# Patient Record
Sex: MALE | Race: BLACK OR AFRICAN AMERICAN | NOT HISPANIC OR LATINO | Employment: FULL TIME | URBAN - METROPOLITAN AREA
[De-identification: names, ages, dates, MRNs, and addresses within clinical notes are randomized per-mention and may not be internally consistent; named-entity substitution may affect disease eponyms.]

---

## 2018-01-15 NOTE — PROGRESS NOTES
CC: Right knee pain    32 y.o. Male who presents as a new patient to me. 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 Complaint today is right knee pain x 4-6 weeks with atraumatic onset. Pain began while running. Pain localizes anteromedial with radiation along medial joint line; initermittent pain with daily walking, worse with running. Usually runs up to 5 miles a day. Has backed off from this completely over the last mos without improvement in sxs. Attempted light running just a few days ago and had problems.  Denies swelling or effusions. No prominent mechanical symptoms. Denies instability. Better with rest, hot/cold modalities. History of previous IM nail right femur. Has had knee pain in past - MRI 2013 negative for internal derangement. History of L Knee ACLR w/ DB HS Auto in 2011 by Dr. Chacon. Overall doing well on the left side, occasional soreness. Here today to discuss diagnosis and treatment options.      Negative for smoking.   Negative for diabetes.      REVIEW OF SYSTEMS:   Constitution: Negative. Negative for chills, fever and night sweats.    Hematologic/Lymphatic: Negative for bleeding problem. Does not bruise/bleed easily.   Skin: Negative for dry skin, itching and rash.   Musculoskeletal: Negative for falls. Positive for right knee pain and  muscle weakness.     PAST MEDICAL HISTORY:   History reviewed. No pertinent past medical history.    PAST SURGICAL HISTORY:   History reviewed. No pertinent surgical history.    FAMILY HISTORY:   Family History   Problem Relation Age of Onset    Cancer Neg Hx        SOCIAL HISTORY:   Social History     Social History    Marital status: Single     Spouse name: N/A    Number of children: N/A    Years of education: N/A     Occupational History    Not on file.     Social History Main Topics    Smoking status: Never Smoker    Smokeless tobacco: Not on file    Alcohol use Not on file    Drug use: Unknown    Sexual activity: Not on file     Other Topics Concern     "Not on file     Social History Narrative    No narrative on file       MEDICATIONS:     Current Outpatient Prescriptions:     ibuprofen (ADVIL,MOTRIN) 600 MG tablet, Take 1 tablet (600 mg total) by mouth every 6 (six) hours as needed for Pain., Disp: 20 tablet, Rfl: 0    ondansetron (ZOFRAN) 4 MG tablet, Take 1 tablet (4 mg total) by mouth every 8 (eight) hours as needed., Disp: 12 tablet, Rfl: 0    ALLERGIES:   Review of patient's allergies indicates:  No Known Allergies     PHYSICAL EXAMINATION:  Ht 5' 11" (1.803 m)   Wt 79.4 kg (175 lb)   BMI 24.41 kg/m²   General: Well-developed well-nourished 32 y.o. malein no acute distress   Cardiovascular: Regular rhythm by palpation of distal pulse, normal color and temperature, no concerning varicosities on symptomatic side   Lungs: No labored breathing or wheezing appreciated   Neuro: Alert and oriented ×3   Psychiatric: well oriented to person, place and time, demonstrates normal mood and affect   Skin: No rashes, lesions or ulcers, normal temperature, turgor, and texture on involved extremity    Ortho/SPM Exam   Bilateral neutral standing alignment.  Examination of the right knee shows no swelling or effusion.  Good quadriceps bulk and tone.  Prominent tenderness over the anterior medial joint line.  Less prominent tenderness over the mid medial and posterior medial joint line.  Stable to varus and valgus stress at 0 and 30°.  No pain with valgus stress testing.  No tenderness specifically over the pes bursa.  Medial discomfort with Sunday's testing.  No mechanical symptoms.  Central patellofemoral tracking.  Negative patellar apprehension. Normal patellar mobility.  Negative patellar facet tenderness.  Negative tilt.  Negative Lachman.  Negative posterior drawer.  Full active and passive range of motion. Tight hamstrings.    Examination of the left knee demonstrates stable Lachman.  Full active and passive range of motion.    IMAGING:    X-rays including " standing, weight bearing AP and flexion bilateral knees, RIGHT knee lateral and sunrise views ordered and images reviewed by me show:    Evidence of previous intramedullary nailing.  No acute or chronic changes of significance.    MRI right knee 2013 - negative    ASSESSMENT:      ICD-10-CM ICD-9-CM   1. Right knee pain, unspecified chronicity M25.561 719.46       PLAN:     Patient presents with chronic recurrent right knee pain.  Anteromedially based.  Likely some degree of patellofemoral pain syndrome with anterior overload and overuse.  History and exam findings are also suspicious for possible medial meniscus tear.  Given the extent duration of his complaints, I believe a repeat MRI is most appropriate.  I'll see him back after the study to discuss results and treatment options.    Procedures

## 2018-01-16 ENCOUNTER — HOSPITAL ENCOUNTER (OUTPATIENT)
Dept: RADIOLOGY | Facility: HOSPITAL | Age: 33
Discharge: HOME | End: 2018-01-16
Attending: ORTHOPAEDIC SURGERY
Payer: COMMERCIAL

## 2018-01-16 ENCOUNTER — OFFICE VISIT (OUTPATIENT)
Dept: SPORTS MEDICINE | Facility: CLINIC | Age: 33
End: 2018-01-16
Payer: COMMERCIAL

## 2018-01-16 VITALS — HEIGHT: 71 IN | BODY MASS INDEX: 24.5 KG/M2 | WEIGHT: 175 LBS

## 2018-01-16 DIAGNOSIS — M25.562 LEFT KNEE PAIN, UNSPECIFIED CHRONICITY: Primary | ICD-10-CM

## 2018-01-16 DIAGNOSIS — M25.561 RIGHT KNEE PAIN, UNSPECIFIED CHRONICITY: ICD-10-CM

## 2018-01-16 DIAGNOSIS — M25.561 RIGHT KNEE PAIN, UNSPECIFIED CHRONICITY: Primary | ICD-10-CM

## 2018-01-16 PROCEDURE — 99204 OFFICE O/P NEW MOD 45 MIN: CPT | Mod: S$GLB,,, | Performed by: ORTHOPAEDIC SURGERY

## 2018-01-16 PROCEDURE — 73562 X-RAY EXAM OF KNEE 3: CPT | Mod: 26,59,LT, | Performed by: RADIOLOGY

## 2018-01-16 PROCEDURE — 73562 X-RAY EXAM OF KNEE 3: CPT | Mod: TC,FY,PO,LT

## 2018-01-16 PROCEDURE — 73564 X-RAY EXAM KNEE 4 OR MORE: CPT | Mod: 26,RT,, | Performed by: RADIOLOGY

## 2018-01-16 PROCEDURE — 99999 PR PBB SHADOW E&M-EST. PATIENT-LVL III: CPT | Mod: PBBFAC,,, | Performed by: ORTHOPAEDIC SURGERY

## 2018-01-16 PROCEDURE — 73564 X-RAY EXAM KNEE 4 OR MORE: CPT | Mod: TC,50,FY,PO

## 2018-01-22 ENCOUNTER — HOSPITAL ENCOUNTER (OUTPATIENT)
Dept: RADIOLOGY | Facility: HOSPITAL | Age: 33
Discharge: HOME | End: 2018-01-22
Attending: ORTHOPAEDIC SURGERY
Payer: COMMERCIAL

## 2018-01-22 DIAGNOSIS — M25.561 RIGHT KNEE PAIN, UNSPECIFIED CHRONICITY: ICD-10-CM

## 2018-01-22 PROCEDURE — 73721 MRI JNT OF LWR EXTRE W/O DYE: CPT | Mod: TC,RT

## 2018-01-22 PROCEDURE — 73721 MRI JNT OF LWR EXTRE W/O DYE: CPT | Mod: 26,RT,, | Performed by: RADIOLOGY

## 2018-01-25 ENCOUNTER — OFFICE VISIT (OUTPATIENT)
Dept: SPORTS MEDICINE | Facility: CLINIC | Age: 33
End: 2018-01-25
Payer: COMMERCIAL

## 2018-01-25 VITALS
SYSTOLIC BLOOD PRESSURE: 133 MMHG | DIASTOLIC BLOOD PRESSURE: 84 MMHG | BODY MASS INDEX: 24.5 KG/M2 | HEART RATE: 68 BPM | WEIGHT: 175 LBS | HEIGHT: 71 IN

## 2018-01-25 DIAGNOSIS — M22.2X1 PATELLOFEMORAL PAIN SYNDROME OF RIGHT KNEE: Primary | ICD-10-CM

## 2018-01-25 DIAGNOSIS — E88.89 HOFFA'S FAT PAD DISEASE: ICD-10-CM

## 2018-01-25 PROCEDURE — 20610 DRAIN/INJ JOINT/BURSA W/O US: CPT | Mod: RT,S$GLB,, | Performed by: ORTHOPAEDIC SURGERY

## 2018-01-25 PROCEDURE — 99213 OFFICE O/P EST LOW 20 MIN: CPT | Mod: 25,S$GLB,, | Performed by: ORTHOPAEDIC SURGERY

## 2018-01-25 PROCEDURE — 99999 PR PBB SHADOW E&M-EST. PATIENT-LVL III: CPT | Mod: PBBFAC,,, | Performed by: ORTHOPAEDIC SURGERY

## 2018-01-25 RX ADMIN — TRIAMCINOLONE ACETONIDE 40 MG: 40 INJECTION, SUSPENSION INTRA-ARTICULAR; INTRAMUSCULAR at 04:01

## 2018-01-28 RX ORDER — INDOMETHACIN 75 MG/1
75 CAPSULE, EXTENDED RELEASE ORAL 2 TIMES DAILY WITH MEALS
Qty: 60 CAPSULE | Refills: 1 | Status: SHIPPED | OUTPATIENT
Start: 2018-01-28 | End: 2018-01-28 | Stop reason: SDUPTHER

## 2018-01-28 RX ORDER — TRIAMCINOLONE ACETONIDE 40 MG/ML
40 INJECTION, SUSPENSION INTRA-ARTICULAR; INTRAMUSCULAR
Status: DISCONTINUED | OUTPATIENT
Start: 2018-01-25 | End: 2018-01-28 | Stop reason: HOSPADM

## 2018-01-28 RX ORDER — INDOMETHACIN 75 MG/1
75 CAPSULE, EXTENDED RELEASE ORAL 2 TIMES DAILY WITH MEALS
Qty: 60 CAPSULE | Refills: 1 | Status: SHIPPED | OUTPATIENT
Start: 2018-01-28 | End: 2018-02-27

## 2018-01-30 ENCOUNTER — TELEPHONE (OUTPATIENT)
Dept: SPORTS MEDICINE | Facility: CLINIC | Age: 33
End: 2018-01-30

## 2018-01-30 ENCOUNTER — PATIENT MESSAGE (OUTPATIENT)
Dept: SPORTS MEDICINE | Facility: CLINIC | Age: 33
End: 2018-01-30

## 2018-03-12 NOTE — PROGRESS NOTES
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 Returns for f/u his recurrent anteromedially based right knee pain. Imaging showed free edge fraying of the medial meniscus, hx and exam consistent with mixed PFPS/anterior overload and inflammed infrapatellar fat pad as well. Steroid Injection on 1/25 provided him with minimal relief. Has been doing a self directed exercise program which has been dedicated and appropriate. Returned to running, a 5 mile run 1-2 x week while wearing the brace. One swelling episode since LOV while he was walking up the stairs and a retropatellar catch. Mild swelling which subsided after a few days. No mechanical symptoms. Denies instability.     History of previous IM nail right femur. Has had knee pain in past - MRI 2013 negative for internal derangement.     History of L Knee ACLR w/ DB HS Auto in 2011 by Dr. Chacon. Overall doing well on the left side, occasional soreness.   Negative for smoking.   Negative for diabetes.     REVIEW OF SYSTEMS:   Constitution: Negative. Negative for chills, fever and night sweats.    Hematologic/Lymphatic: Negative for bleeding problem. Does not bruise/bleed easily.   Skin: Negative for dry skin, itching and rash.   Musculoskeletal: Negative for falls. Positive for right knee pain and  muscle weakness.     PAST MEDICAL HISTORY:   History reviewed. No pertinent past medical history.    PAST SURGICAL HISTORY:   History reviewed. No pertinent surgical history.    FAMILY HISTORY:   Family History   Problem Relation Age of Onset    Cancer Neg Hx        SOCIAL HISTORY:   Social History     Social History    Marital status: Single     Spouse name: N/A    Number of children: N/A    Years of education: N/A     Occupational History    Not on file.     Social History Main Topics    Smoking status: Never Smoker    Smokeless tobacco: Not on file    Alcohol use Not on file     "Drug use: Unknown    Sexual activity: Not on file     Other Topics Concern    Not on file     Social History Narrative    No narrative on file       MEDICATIONS:     Current Outpatient Prescriptions:     ibuprofen (ADVIL,MOTRIN) 600 MG tablet, Take 1 tablet (600 mg total) by mouth every 6 (six) hours as needed for Pain., Disp: 20 tablet, Rfl: 0    ondansetron (ZOFRAN) 4 MG tablet, Take 1 tablet (4 mg total) by mouth every 8 (eight) hours as needed., Disp: 12 tablet, Rfl: 0    ALLERGIES:   Review of patient's allergies indicates:  No Known Allergies     PHYSICAL EXAMINATION:  /70   Pulse 63   Ht 5' 11" (1.803 m)   Wt 79.4 kg (175 lb)   BMI 24.41 kg/m²   General: Well-developed well-nourished 32 y.o. malein no acute distress   Cardiovascular: Regular rhythm by palpation of distal pulse, normal color and temperature, no concerning varicosities on symptomatic side   Lungs: No labored breathing or wheezing appreciated   Neuro: Alert and oriented ×3   Psychiatric: well oriented to person, place and time, demonstrates normal mood and affect   Skin: No rashes, lesions or ulcers, normal temperature, turgor, and texture on involved extremity    Ortho/SPM Exam   Repeat right knee exam shows again bilateral neutral standing alignment.  No swelling or effusion.  Good quadriceps bulk and tone.  Prominent tenderness over the anterior medial>direct medial; joint line and deep over the infrapatellar fat pad.  No tenderness specifically over the pes bursa.  Mild pain medially with Sunday's testing. Neg Thessaly's test.  Central patellofemoral tracking.  Negative patellar apprehension. Normal patellar mobility.  Negative patellar facet tenderness.  Negative tilt.  No tenderness along course of the patellar tendon. Negative Lachman.  Negative posterior drawer. Stable to varus and valgus stress at 0 and 30°.  No pain with valgus stress testing.  Full active and passive range of motion.  Improved quadriceps and hip " abductor strength. Normal hamstring stretch    IMAGING:    X-rays including standing, weight bearing AP and flexion bilateral knees, RIGHT knee lateral and sunrise views ordered and images reviewed by me show:    Evidence of previous intramedullary nailing.  No acute or chronic changes of significance.    Right Knee MRI was reviewed with the patient. Images show:   1. Central free edge fraying of the body of the medial meniscus. Otherwise, no evidence of internal derangement.   2. Partially visualized intramedullary fixation nail spanning the distal femoral diaphysis.  On my read there's a small area of inflammation and increased signal over the infrapatellar fat pad. Certainly no prominent findings otherwise.     ASSESSMENT:      ICD-10-CM ICD-9-CM   1. Other old tear of medial meniscus of right knee M23.203 717.3   2. 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 Patellofemoral pain syndrome of right knee M22.2X1 719.46       PLAN:     The patient has become quite frustrated with his continued pain.  This is been an issue off and on for 3-4 months.  Does not necessarily restrict activity but is bothersome particularly with higher impact exercises.  Pain persists despite initial conservative treatment.  At this point treatment options were reviewed.  I have offered a trial of Visco supplement injections which may assist particularly with the anterior knee component of his pain.  I do believe his medial meniscus free edge fraying may be symptomatic for him as well, and in that respect, I believe diagnostic arthroscopy with meniscus treatment as indicated is also an option.  The details of such were reviewed.  The patient would like to think things over and will let us know how he would like to proceed.    Procedures

## 2018-03-13 ENCOUNTER — OFFICE VISIT (OUTPATIENT)
Dept: SPORTS MEDICINE | Facility: CLINIC | Age: 33
End: 2018-03-13
Payer: COMMERCIAL

## 2018-03-13 VITALS
BODY MASS INDEX: 24.5 KG/M2 | HEIGHT: 71 IN | SYSTOLIC BLOOD PRESSURE: 132 MMHG | HEART RATE: 63 BPM | DIASTOLIC BLOOD PRESSURE: 70 MMHG | WEIGHT: 175 LBS

## 2018-03-13 DIAGNOSIS — M22.2X1 PATELLOFEMORAL PAIN SYNDROME OF RIGHT KNEE: ICD-10-CM

## 2018-03-13 DIAGNOSIS — M23.203 OTHER OLD TEAR OF MEDIAL MENISCUS OF RIGHT KNEE: Primary | ICD-10-CM

## 2018-03-13 DIAGNOSIS — E88.89 HOFFA'S FAT PAD DISEASE: ICD-10-CM

## 2018-03-13 PROCEDURE — 99999 PR PBB SHADOW E&M-EST. PATIENT-LVL III: CPT | Mod: PBBFAC,,, | Performed by: ORTHOPAEDIC SURGERY

## 2018-03-13 PROCEDURE — 99213 OFFICE O/P EST LOW 20 MIN: CPT | Mod: S$GLB,,, | Performed by: ORTHOPAEDIC SURGERY

## 2018-03-15 RX ORDER — IBUPROFEN 600 MG/1
600 TABLET ORAL EVERY 6 HOURS PRN
Qty: 20 TABLET | Refills: 0 | Status: SHIPPED | OUTPATIENT
Start: 2018-03-15

## 2018-03-19 ENCOUNTER — TELEPHONE (OUTPATIENT)
Dept: SPORTS MEDICINE | Facility: CLINIC | Age: 33
End: 2018-03-19

## 2018-03-19 NOTE — TELEPHONE ENCOUNTER
Patient brought paperwork regarding his physical capabilities for [employer].The paperwork was complete. A message was left for the patient that he can pick the originals up from our  anytime during business hours.    Melodie Lindsey MA  [facility]

## 2018-11-05 NOTE — PROGRESS NOTES
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 Returns for f/u his recurrent anteromedially based right knee pain. Imaging showed free edge fraying of the medial meniscus, hx and exam consistent with mixed PFPS/anterior overload and inflammed infrapatellar fat pad as well. Steroid Injection on 1/25 provided him with minimal relief. Pain has been more consistent as of late. Some pain with daily walking, pain exacerbated with running. No mechanical symptoms. Denies instability. Additional treatment has included home exercise program, oral medication, injections and activity modifications.    History of previous IM nail right femur. Has had knee pain in past - MRI 2013 negative for internal derangement.     History of L Knee ACLR w/ DB HS Auto in 2011 by Dr. Chacon. Overall doing well on the left side, occasional soreness.   Negative for smoking.   Negative for diabetes.     REVIEW OF SYSTEMS:   Constitution: Negative. Negative for chills, fever and night sweats.    Hematologic/Lymphatic: Negative for bleeding problem. Does not bruise/bleed easily.   Skin: Negative for dry skin, itching and rash.   Musculoskeletal: Negative for falls. Positive for right knee pain and  muscle weakness.     All other review of symptoms were reviewed and found to be noncontributory.     PAST MEDICAL HISTORY:   History reviewed. No pertinent past medical history.    PAST SURGICAL HISTORY:   History reviewed. No pertinent surgical history.    FAMILY HISTORY:   Family History   Problem Relation Age of Onset    Cancer Neg Hx        SOCIAL HISTORY:   Social History     Socioeconomic History    Marital status: Single     Spouse name: Not on file    Number of children: Not on file    Years of education: Not on file    Highest education level: Not on file   Social Needs    Financial resource strain: Not on file    Food insecurity - worry: Not on file    Food  "insecurity - inability: Not on file    Transportation needs - medical: Not on file    Transportation needs - non-medical: Not on file   Occupational History    Not on file   Tobacco Use    Smoking status: Never Smoker   Substance and Sexual Activity    Alcohol use: Not on file    Drug use: Not on file    Sexual activity: Not on file   Other Topics Concern    Not on file   Social History Narrative    Not on file       MEDICATIONS:     Current Outpatient Medications:     ibuprofen (ADVIL,MOTRIN) 600 MG tablet, Take 1 tablet (600 mg total) by mouth every 6 (six) hours as needed for Pain., Disp: 20 tablet, Rfl: 0    ondansetron (ZOFRAN) 4 MG tablet, Take 1 tablet (4 mg total) by mouth every 8 (eight) hours as needed., Disp: 12 tablet, Rfl: 0    ALLERGIES:   Review of patient's allergies indicates:  No Known Allergies     PHYSICAL EXAMINATION:  BP (!) 149/93   Pulse 62   Ht 5' 11" (1.803 m)   Wt 79.4 kg (175 lb)   BMI 24.41 kg/m²   General: Well-developed well-nourished 32 y.o. malein no acute distress   Cardiovascular: Regular rhythm by palpation of distal pulse, normal color and temperature, no concerning varicosities on symptomatic side   Lungs: No labored breathing or wheezing appreciated   Neuro: Alert and oriented ×3   Psychiatric: well oriented to person, place and time, demonstrates normal mood and affect   Skin: No rashes, lesions or ulcers, normal temperature, turgor, and texture on involved extremity    Ortho/SPM Exam   Repeat right knee exam shows again bilateral neutral standing alignment.  No swelling or effusion.  Good quadriceps bulk and tone.  Prominent tenderness over the anterior medial>direct medial; joint line and deep over the infrapatellar fat pad.  No tenderness specifically over the pes bursa.  Positive Sunday's test for pain medially.  Pain medially with varus load.  Central patellofemoral tracking.  Negative patellar apprehension. Normal patellar mobility.  Negative patellar " facet tenderness.  Negative tilt.  No tenderness along course of the patellar tendon. Negative Lachman.  Negative posterior drawer. Stable to varus and valgus stress at 0 and 30°.  No pain with valgus stress testing.  Full active and passive range of motion.  Improved quadriceps and hip abductor strength. Normal hamstring stretch    IMAGING:    X-rays including standing, weight bearing AP and flexion bilateral knees, RIGHT knee lateral and sunrise views ordered and images reviewed by me show:    Evidence of previous intramedullary nailing.  No acute or chronic changes of significance.    Right Knee MRI was reviewed with the patient. Images show:   1. Central free edge fraying of the body of the medial meniscus. Otherwise, no evidence of internal derangement.   2. Partially visualized intramedullary fixation nail spanning the distal femoral diaphysis.  On my read there's a small area of inflammation and increased signal over the infrapatellar fat pad. Certainly no prominent findings otherwise.     ASSESSMENT:      ICD-10-CM ICD-9-CM   1. Other old tear of medial meniscus of right knee M23.203 717.3   2. 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 Patellofemoral pain syndrome of right knee M22.2X1 719.46       PLAN:     -Findings and treatment options were discussed with the patient, both operative and non operative   -Extensive non operative treatment to this point with continued symptoms which limited his activities.  -Plan for a R Knee arthroscopic medial meniscectomy, chondroplasty, fat pad debridement. The details of such were discussed to include the expected postop rehab recovery course.    -Patient will discuss timing with his wife & call our office with a preferred date   -No med clearance needed  -RTC for preoperative appointment   -All questions answered    Informed Consent:    The details of the surgical procedure were explained, including the location of probable incisions and a description of possible  hardware and/or grafts to be used. We also discussed the potential benefit of Amniox tissue biologic augmentation with associated literature support, theoretical risks and benefits. Alternatives to both operative and non-operative options with associated risks and benefits were discussed. The patient understands the likely convalescence after surgery and, in particular, the expected postop rehab and recovery course. The outlined risks and potential complications of the proposed procedure include but are not limited to: infection, poor wound healing, scarring, deformity, stiffness, swelling, continued or recurrent pain, instability, hardware or prosthetic failure if implanted, symptomatic hardware requiring removal, weakness, neurovascular injury, numbness, chronic regional pain disorder, tissue nonhealing/irreparability/retear, subsequent contralateral limb injury or pathology, chondral injury, arthritis, fracture, blood clot formation, inability to return to previous level of activity, anesthetic or regional block complication up to death, need for additional procedure as indicated intraoperatively, and potential need for further surgery.    The patient was also informed and understands that the risks of surgery are greater for patients with a current condition or history of heart disease, obesity, clotting disorders, recurrent infections, steroid use, current or past smoking, and factors such as sedentary lifestyle and noncompliance with medications, therapy or follow-up. The degree of the increased risk is hard to estimate with any degree of precision. If applicable, smoking cessation was discussed.     All questions were answered. The patient has verbalized understanding of these issues and wishes to proceed with the surgery as discussed.        Procedures

## 2018-11-06 ENCOUNTER — OFFICE VISIT (OUTPATIENT)
Dept: SPORTS MEDICINE | Facility: CLINIC | Age: 33
End: 2018-11-06
Payer: COMMERCIAL

## 2018-11-06 VITALS
HEART RATE: 62 BPM | BODY MASS INDEX: 24.5 KG/M2 | SYSTOLIC BLOOD PRESSURE: 149 MMHG | WEIGHT: 175 LBS | DIASTOLIC BLOOD PRESSURE: 93 MMHG | HEIGHT: 71 IN

## 2018-11-06 DIAGNOSIS — M22.2X1 PATELLOFEMORAL PAIN SYNDROME OF RIGHT KNEE: ICD-10-CM

## 2018-11-06 DIAGNOSIS — M23.203 OTHER OLD TEAR OF MEDIAL MENISCUS OF RIGHT KNEE: Primary | ICD-10-CM

## 2018-11-06 DIAGNOSIS — E88.89 HOFFA'S FAT PAD DISEASE: ICD-10-CM

## 2018-11-06 PROCEDURE — 3008F BODY MASS INDEX DOCD: CPT | Mod: CPTII,S$GLB,, | Performed by: ORTHOPAEDIC SURGERY

## 2018-11-06 PROCEDURE — 99999 PR PBB SHADOW E&M-EST. PATIENT-LVL III: CPT | Mod: PBBFAC,,, | Performed by: ORTHOPAEDIC SURGERY

## 2018-11-06 PROCEDURE — 99214 OFFICE O/P EST MOD 30 MIN: CPT | Mod: S$GLB,,, | Performed by: ORTHOPAEDIC SURGERY

## 2018-11-09 DIAGNOSIS — M79.4 HYPERTROPHY OF FAT PAD OF KNEE: Primary | ICD-10-CM

## 2018-11-09 DIAGNOSIS — M23.203 OLD TEAR OF MEDIAL MENISCUS OF RIGHT KNEE, UNSPECIFIED TEAR TYPE: ICD-10-CM

## 2018-12-12 ENCOUNTER — HOSPITAL ENCOUNTER (OUTPATIENT)
Dept: PREADMISSION TESTING | Facility: OTHER | Age: 33
Discharge: HOME | End: 2018-12-12
Attending: ORTHOPAEDIC SURGERY
Payer: COMMERCIAL

## 2018-12-12 ENCOUNTER — ANESTHESIA EVENT (OUTPATIENT)
Dept: SURGERY | Facility: OTHER | Age: 33
End: 2018-12-12
Payer: COMMERCIAL

## 2018-12-12 ENCOUNTER — OFFICE VISIT (OUTPATIENT)
Dept: SPORTS MEDICINE | Facility: CLINIC | Age: 33
End: 2018-12-12
Payer: COMMERCIAL

## 2018-12-12 VITALS
HEIGHT: 71 IN | HEART RATE: 72 BPM | TEMPERATURE: 98 F | DIASTOLIC BLOOD PRESSURE: 84 MMHG | BODY MASS INDEX: 24.5 KG/M2 | OXYGEN SATURATION: 100 % | WEIGHT: 175 LBS | SYSTOLIC BLOOD PRESSURE: 131 MMHG

## 2018-12-12 VITALS
BODY MASS INDEX: 24.5 KG/M2 | WEIGHT: 175 LBS | HEART RATE: 65 BPM | SYSTOLIC BLOOD PRESSURE: 131 MMHG | HEIGHT: 71 IN | DIASTOLIC BLOOD PRESSURE: 81 MMHG

## 2018-12-12 DIAGNOSIS — E88.89 HOFFA'S FAT PAD DISEASE: ICD-10-CM

## 2018-12-12 DIAGNOSIS — M23.91 INTERNAL DERANGEMENT OF RIGHT KNEE: Primary | ICD-10-CM

## 2018-12-12 DIAGNOSIS — M23.203 OTHER OLD TEAR OF MEDIAL MENISCUS OF RIGHT KNEE: ICD-10-CM

## 2018-12-12 DIAGNOSIS — M22.2X1 PATELLOFEMORAL PAIN SYNDROME OF RIGHT KNEE: ICD-10-CM

## 2018-12-12 PROCEDURE — 99499 UNLISTED E&M SERVICE: CPT | Mod: S$GLB,,, | Performed by: PHYSICIAN ASSISTANT

## 2018-12-12 PROCEDURE — 99999 PR PBB SHADOW E&M-EST. PATIENT-LVL III: CPT | Mod: PBBFAC,,, | Performed by: PHYSICIAN ASSISTANT

## 2018-12-12 RX ORDER — ASPIRIN 81 MG/1
TABLET ORAL
Qty: 28 TABLET | Refills: 0 | COMMUNITY
Start: 2018-12-12

## 2018-12-12 RX ORDER — ONDANSETRON 4 MG/1
4 TABLET, FILM COATED ORAL EVERY 8 HOURS PRN
Qty: 30 TABLET | Refills: 0 | Status: SHIPPED | OUTPATIENT
Start: 2018-12-12

## 2018-12-12 RX ORDER — ISOTRETINOIN 40 MG/1
40 CAPSULE ORAL WEEKLY
COMMUNITY

## 2018-12-12 RX ORDER — SODIUM CHLORIDE, SODIUM LACTATE, POTASSIUM CHLORIDE, CALCIUM CHLORIDE 600; 310; 30; 20 MG/100ML; MG/100ML; MG/100ML; MG/100ML
INJECTION, SOLUTION INTRAVENOUS CONTINUOUS
Status: CANCELLED | OUTPATIENT
Start: 2018-12-12

## 2018-12-12 RX ORDER — SODIUM CHLORIDE 0.9 % (FLUSH) 0.9 %
5 SYRINGE (ML) INJECTION
Status: CANCELLED | OUTPATIENT
Start: 2018-12-12

## 2018-12-12 RX ORDER — OXYCODONE HYDROCHLORIDE 5 MG/1
TABLET ORAL
Qty: 42 TABLET | Refills: 0 | Status: SHIPPED | OUTPATIENT
Start: 2018-12-12

## 2018-12-12 RX ORDER — MUPIROCIN 20 MG/G
OINTMENT TOPICAL
Status: CANCELLED | OUTPATIENT
Start: 2018-12-12

## 2018-12-12 NOTE — H&P
Anibal Pedro III  is here for a completion of his perioperative paperwork. he  Is scheduled to undergo  R Knee arthroscopic medial meniscectomy, chondroplasty, fat pad debridement   on 12/14/2018.  He is a healthy individual and does not need clearance for this procedure.     Risks, indications and benefits of the surgical procedure were discussed with the patient. All questions with regard to surgery, rehab, expected return to functional activities, activities of daily living and recreational endeavors were answered to his satisfaction.    Patient was informed and understands the risks of surgery are greater for patients with a current condition or hx of heart disease, obesity, clotting disorders, recurrent infections, steroid use, current or past smoking, and factors such as sedentary lifestyle and noncompliance with medications, therapy or f/u. The degree of the increased risk is hard to estimate w/ any degree of precision.    Once no other questions were asked, a brief history and physical exam was then performed.    PAST MEDICAL HISTORY: No past medical history on file.  PAST SURGICAL HISTORY: No past surgical history on file.  FAMILY HISTORY:   Family History   Problem Relation Age of Onset    Cancer Neg Hx      SOCIAL HISTORY:   Social History     Socioeconomic History    Marital status: Single     Spouse name: Not on file    Number of children: Not on file    Years of education: Not on file    Highest education level: Not on file   Social Needs    Financial resource strain: Not on file    Food insecurity - worry: Not on file    Food insecurity - inability: Not on file    Transportation needs - medical: Not on file    Transportation needs - non-medical: Not on file   Occupational History    Not on file   Tobacco Use    Smoking status: Never Smoker   Substance and Sexual Activity    Alcohol use: Not on file    Drug use: Not on file    Sexual activity: Not on file   Other Topics  Concern    Not on file   Social History Narrative    Not on file       MEDICATIONS:   Current Outpatient Medications:     ibuprofen (ADVIL,MOTRIN) 600 MG tablet, Take 1 tablet (600 mg total) by mouth every 6 (six) hours as needed for Pain., Disp: 20 tablet, Rfl: 0    ondansetron (ZOFRAN) 4 MG tablet, Take 1 tablet (4 mg total) by mouth every 8 (eight) hours as needed., Disp: 12 tablet, Rfl: 0  ALLERGIES: Review of patient's allergies indicates:  No Known Allergies    Review of Systems   Constitution: Negative. Negative for chills, fever and night sweats.   HENT: Negative for congestion and headaches.    Eyes: Negative for blurred vision, left vision loss and right vision loss.   Cardiovascular: Negative for chest pain and syncope.   Respiratory: Negative for cough and shortness of breath.    Endocrine: Negative for polydipsia, polyphagia and polyuria.   Hematologic/Lymphatic: Negative for bleeding problem. Does not bruise/bleed easily.   Skin: Negative for dry skin, itching and rash.   Musculoskeletal: Negative for falls and muscle weakness.   Gastrointestinal: Negative for abdominal pain and bowel incontinence.   Genitourinary: Negative for bladder incontinence and nocturia.   Neurological: Negative for disturbances in coordination, loss of balance and seizures.   Psychiatric/Behavioral: Negative for depression. The patient does not have insomnia.    Allergic/Immunologic: Negative for hives and persistent infections.     PHYSICAL EXAM:  GEN: A&Ox3, WD WN NAD  HEENT: WNL  CHEST: CTAB, no W/R/R  HEART: RRR, no M/R/G   ABD: Soft, NT ND, BS x4 QUADS  MS: Refer to previous note for detailed MS exam  NEURO: CN II-XII intact       The surgical consent was then reviewed with the patient, who agreed with all the contents of the consent form and it was signed.     PHYSICAL THERAPY:  He was also instructed regarding physical therapy and will begin on POD#1-3. He is doing physical therapy at [facility].      POST OP CARE: Instructions were reviewed including care of the wound and dressing after surgery and when he can shower.     PAIN MANAGEMENT: Anibal Pedro III was instructed regarding the Polar ice unit that will be in place after surgery and his postoperative pain medications.     MEDICATION:  Roxicodone 5 mg 1-2 q 4 hours PRN for pain  Zofran 4 mg q 8 hours PRN for nausea and vomiting.  Aspirin 81mg BID x 2 weeks for DVT prophylaxis starting on the evening after surgery.    Patient was instructed to purchase and take Colace to counter possible GI side effects of taking opiates.     DVT prophylaxis was discussed with the patient today including risk factors for developing DVTs and history of DVTs. The patient was asked if any specific recommendations were given from the doctor/s that did pre-operative surgical clearance.      If the patient was previously taking 81mg baby aspirin, they were told to not take additional baby aspirin, using the above stated aspirin and to restart the 81mg aspirin daily after completion of the aspirin dose.      Patient was also told to buy over the counter Prilosec medication and take it once daily for GI protection as long as they are taking NSAIDs or Aspirin.     The patient was told that narcotic pain medications may make them drowsy and instructions were given to not sign legal documents, drive or operate heavy machinery, cars, or equipment while under the influence of narcotic medications.     Dr. Stevenson was present in clinic during this pre-op evaluation. The patient was offered the opportunity to ask Dr. Stevenson any further questions regarding the procedure which may not have been addressed during their previous informed consent discussion. The patient has declined to see Dr. Stevenson today.    As there were no other questions to be asked, he was given my business card along with Dr. Stevenson's business card if he has any questions or concerns prior to  surgery or in the postop period.

## 2018-12-12 NOTE — DISCHARGE INSTRUCTIONS
PRE-ADMIT TESTING -  [phone]    [address]  [facility]          Your surgery has been scheduled at [facility]. We are pleased to have the opportunity to serve you. For Further Information please call [phone].    On the day of surgery please report to the Information Desk on the 1st floor.    · CONTACT YOUR PHYSICIAN'S OFFICE THE DAY PRIOR TO YOUR SURGERY TO OBTAIN YOUR ARRIVAL TIME.     · The evening before surgery do not eat anything after 9 p.m. ( this includes hard candy, chewing gum and mints).  You may only have GATORADE, POWERADE AND WATER  from 9 p.m. until you leave your home.   DO NOT DRINK ANY LIQUIDS ON THE WAY TO THE HOSPITAL.      SPECIAL MEDICATION INSTRUCTIONS: TAKE medications checked off by the Anesthesiologist on your Medication List.    Angiogram Patients: Take medications as instructed by your physician, including aspirin.     Surgery Patients:    If you take ASPIRIN - Your PHYSICIAN/SURGEON will need to inform you IF/OR when you need to stop taking aspirin prior to your surgery.     Do Not take any medications containing IBUPROFEN.  Do Not Wear any make-up or dark nail polish   (especially eye make-up) to surgery. If you come to surgery with makeup on you will be required to remove the makeup or nail polish.    Do not shave your surgical area at least 5 days prior to your surgery. The surgical prep will be performed at the hospital according to Infection Control regulations.    Leave all valuables at home.   Do Not wear any jewelry or watches, including any metal in body piercings.  Contact Lens must be removed before surgery. Either do not wear the contact lens or bring a case and solution for storage.  Please bring a container for eyeglasses or dentures as required.  Bring any paperwork your physician has provided, such as consent forms,  history and physicals, doctor's orders, etc.   Bring comfortable clothes that are loose fitting to wear upon  discharge. Take into consideration the type of surgery being performed.  Maintain your diet as advised per your physician the day prior to surgery.      Adequate rest the night before surgery is advised.   Park in the Parking lot behind the hospital or in the [facility] across the street from the parking lot. Parking is complimentary.  If you will be discharged the same day as your procedure, please arrange for a responsible adult to drive you home or to accompany you if traveling by taxi.   YOU WILL NOT BE PERMITTED TO DRIVE OR TO LEAVE THE HOSPITAL ALONE AFTER SURGERY.   It is strongly recommended that you arrange for someone to remain with you for the first 24 hrs following your surgery.       Thank you for your cooperation.  The Staff of [facility].        Bathing Instructions                                                                 Please shower the evening before and morning of your procedure with    ANTIBACTERIAL SOAP. ( DIAL, etc )  Concentrate on the surgical area   for at least 3 minutes and rinse completely. Dry off as usual.   Do not use any deodorant, powder, body lotions, perfume, after shave or    cologne.

## 2018-12-12 NOTE — ANESTHESIA PREPROCEDURE EVALUATION
12/12/2018  Anibal Pedro III is a 33 y.o., male.    Anesthesia Evaluation    I have reviewed the Patient Summary Reports.    I have reviewed the Nursing Notes.   I have reviewed the Medications.     Review of Systems  Anesthesia Hx:  No problems with previous Anesthesia  Denies Family Hx of Anesthesia complications.   Denies Personal Hx of Anesthesia complications.   Social:  Non-Smoker    Hematology/Oncology:  Hematology Normal   Oncology Normal     EENT/Dental:EENT/Dental Normal   Cardiovascular:  Cardiovascular Normal Exercise tolerance: good     Pulmonary:  Pulmonary Normal    Renal/:  Renal/ Normal     Musculoskeletal:  Musculoskeletal Normal    Neurological:  Neurology Normal    Endocrine:  Endocrine Normal    Dermatological:  Skin Normal    Psych:  Psychiatric Normal           Physical Exam  General:  Well nourished    Airway/Jaw/Neck:  Airway Findings: Mouth Opening: Normal Tongue: Normal  General Airway Assessment: Adult  Mallampati: I  TM Distance: Normal, at least 6 cm  Jaw/Neck Findings:  Neck ROM: Normal ROM      Dental:  Dental Findings: In tact             Anesthesia Plan  Type of Anesthesia, risks & benefits discussed:  Anesthesia Type:  general  Patient's Preference:   Intra-op Monitoring Plan:   Intra-op Monitoring Plan Comments:   Post Op Pain Control Plan:   Post Op Pain Control Plan Comments:   Induction:   IV  Beta Blocker:         Informed Consent: Patient understands risks and agrees with Anesthesia plan.  Questions answered. Anesthesia consent signed with patient.  ASA Score: 1     Day of Surgery Review of History & Physical:    H&P update referred to the surgeon.         Ready For Surgery From Anesthesia Perspective.

## 2018-12-14 ENCOUNTER — ANESTHESIA (OUTPATIENT)
Dept: SURGERY | Facility: OTHER | Age: 33
End: 2018-12-14
Payer: COMMERCIAL

## 2018-12-14 ENCOUNTER — HOSPITAL ENCOUNTER (OUTPATIENT)
Facility: OTHER | Age: 33
Discharge: HOME | End: 2018-12-14
Attending: ORTHOPAEDIC SURGERY | Admitting: ORTHOPAEDIC SURGERY
Payer: COMMERCIAL

## 2018-12-14 VITALS
DIASTOLIC BLOOD PRESSURE: 77 MMHG | HEIGHT: 71 IN | WEIGHT: 174 LBS | BODY MASS INDEX: 24.36 KG/M2 | TEMPERATURE: 98 F | SYSTOLIC BLOOD PRESSURE: 136 MMHG | HEART RATE: 78 BPM | RESPIRATION RATE: 16 BRPM | OXYGEN SATURATION: 100 %

## 2018-12-14 DIAGNOSIS — M25.561 ARTHRALGIA OF RIGHT LOWER LEG: Primary | ICD-10-CM

## 2018-12-14 DIAGNOSIS — M23.91 INTERNAL DERANGEMENT OF RIGHT KNEE: ICD-10-CM

## 2018-12-14 DIAGNOSIS — E88.89 HOFFA'S FAT PAD DISEASE: ICD-10-CM

## 2018-12-14 DIAGNOSIS — M23.203 OTHER OLD TEAR OF MEDIAL MENISCUS OF RIGHT KNEE: ICD-10-CM

## 2018-12-14 PROCEDURE — 63600175 PHARM REV CODE 636 W HCPCS: Performed by: ANESTHESIOLOGY

## 2018-12-14 PROCEDURE — 29876 ARTHRS KNEE SURG SYNVCT MAJ: CPT | Mod: RT,,, | Performed by: ORTHOPAEDIC SURGERY

## 2018-12-14 PROCEDURE — 71000015 HC POSTOP RECOV 1ST HR: Performed by: ORTHOPAEDIC SURGERY

## 2018-12-14 PROCEDURE — 25000003 PHARM REV CODE 250: Performed by: ANESTHESIOLOGY

## 2018-12-14 PROCEDURE — 63600175 PHARM REV CODE 636 W HCPCS: Performed by: NURSE ANESTHETIST, CERTIFIED REGISTERED

## 2018-12-14 PROCEDURE — 63600175 PHARM REV CODE 636 W HCPCS: Performed by: PHYSICIAN ASSISTANT

## 2018-12-14 PROCEDURE — 71000033 HC RECOVERY, INTIAL HOUR: Performed by: ORTHOPAEDIC SURGERY

## 2018-12-14 PROCEDURE — 25000003 PHARM REV CODE 250: Performed by: PHYSICIAN ASSISTANT

## 2018-12-14 PROCEDURE — 71000039 HC RECOVERY, EACH ADD'L HOUR: Performed by: ORTHOPAEDIC SURGERY

## 2018-12-14 PROCEDURE — 37000009 HC ANESTHESIA EA ADD 15 MINS: Performed by: ORTHOPAEDIC SURGERY

## 2018-12-14 PROCEDURE — 63600175 PHARM REV CODE 636 W HCPCS: Performed by: ORTHOPAEDIC SURGERY

## 2018-12-14 PROCEDURE — 36000711: Performed by: ORTHOPAEDIC SURGERY

## 2018-12-14 PROCEDURE — 36000710: Performed by: ORTHOPAEDIC SURGERY

## 2018-12-14 PROCEDURE — 71000016 HC POSTOP RECOV ADDL HR: Performed by: ORTHOPAEDIC SURGERY

## 2018-12-14 PROCEDURE — 37000008 HC ANESTHESIA 1ST 15 MINUTES: Performed by: ORTHOPAEDIC SURGERY

## 2018-12-14 PROCEDURE — 27201423 OPTIME MED/SURG SUP & DEVICES STERILE SUPPLY: Performed by: ORTHOPAEDIC SURGERY

## 2018-12-14 RX ORDER — HYDROMORPHONE HYDROCHLORIDE 2 MG/ML
0.4 INJECTION, SOLUTION INTRAMUSCULAR; INTRAVENOUS; SUBCUTANEOUS EVERY 5 MIN PRN
Status: DISCONTINUED | OUTPATIENT
Start: 2018-12-14 | End: 2018-12-14 | Stop reason: HOSPADM

## 2018-12-14 RX ORDER — KETOROLAC TROMETHAMINE 30 MG/ML
INJECTION, SOLUTION INTRAMUSCULAR; INTRAVENOUS
Status: DISCONTINUED | OUTPATIENT
Start: 2018-12-14 | End: 2018-12-14

## 2018-12-14 RX ORDER — MUPIROCIN 20 MG/G
OINTMENT TOPICAL
Status: DISCONTINUED | OUTPATIENT
Start: 2018-12-14 | End: 2018-12-14 | Stop reason: HOSPADM

## 2018-12-14 RX ORDER — PROPOFOL 10 MG/ML
VIAL (ML) INTRAVENOUS
Status: DISCONTINUED | OUTPATIENT
Start: 2018-12-14 | End: 2018-12-14

## 2018-12-14 RX ORDER — EPINEPHRINE 1 MG/ML
INJECTION, SOLUTION INTRACARDIAC; INTRAMUSCULAR; INTRAVENOUS; SUBCUTANEOUS
Status: DISCONTINUED | OUTPATIENT
Start: 2018-12-14 | End: 2018-12-14 | Stop reason: HOSPADM

## 2018-12-14 RX ORDER — DEXAMETHASONE SODIUM PHOSPHATE 4 MG/ML
INJECTION, SOLUTION INTRA-ARTICULAR; INTRALESIONAL; INTRAMUSCULAR; INTRAVENOUS; SOFT TISSUE
Status: DISCONTINUED | OUTPATIENT
Start: 2018-12-14 | End: 2018-12-14

## 2018-12-14 RX ORDER — SODIUM CHLORIDE 0.9 % (FLUSH) 0.9 %
5 SYRINGE (ML) INJECTION
Status: DISCONTINUED | OUTPATIENT
Start: 2018-12-14 | End: 2018-12-14 | Stop reason: HOSPADM

## 2018-12-14 RX ORDER — OXYCODONE HYDROCHLORIDE 5 MG/1
5 TABLET ORAL ONCE AS NEEDED
Status: COMPLETED | OUTPATIENT
Start: 2018-12-14 | End: 2018-12-14

## 2018-12-14 RX ORDER — MEPERIDINE HYDROCHLORIDE 50 MG/ML
12.5 INJECTION INTRAMUSCULAR; INTRAVENOUS; SUBCUTANEOUS ONCE AS NEEDED
Status: DISCONTINUED | OUTPATIENT
Start: 2018-12-14 | End: 2018-12-14 | Stop reason: HOSPADM

## 2018-12-14 RX ORDER — ONDANSETRON HYDROCHLORIDE 2 MG/ML
INJECTION, SOLUTION INTRAMUSCULAR; INTRAVENOUS
Status: DISCONTINUED | OUTPATIENT
Start: 2018-12-14 | End: 2018-12-14

## 2018-12-14 RX ORDER — SODIUM CHLORIDE, SODIUM LACTATE, POTASSIUM CHLORIDE, CALCIUM CHLORIDE 600; 310; 30; 20 MG/100ML; MG/100ML; MG/100ML; MG/100ML
INJECTION, SOLUTION INTRAVENOUS CONTINUOUS
Status: DISCONTINUED | OUTPATIENT
Start: 2018-12-14 | End: 2018-12-14 | Stop reason: HOSPADM

## 2018-12-14 RX ORDER — OXYCODONE HYDROCHLORIDE 5 MG/1
5 TABLET ORAL
Status: DISCONTINUED | OUTPATIENT
Start: 2018-12-14 | End: 2018-12-14 | Stop reason: HOSPADM

## 2018-12-14 RX ORDER — SODIUM CHLORIDE 0.9 % (FLUSH) 0.9 %
3 SYRINGE (ML) INJECTION
Status: DISCONTINUED | OUTPATIENT
Start: 2018-12-14 | End: 2018-12-14 | Stop reason: HOSPADM

## 2018-12-14 RX ORDER — CEFAZOLIN SODIUM 1 G/3ML
2 INJECTION, POWDER, FOR SOLUTION INTRAMUSCULAR; INTRAVENOUS
Status: DISCONTINUED | OUTPATIENT
Start: 2018-12-14 | End: 2018-12-14 | Stop reason: HOSPADM

## 2018-12-14 RX ORDER — MIDAZOLAM HYDROCHLORIDE 1 MG/ML
INJECTION INTRAMUSCULAR; INTRAVENOUS
Status: DISCONTINUED | OUTPATIENT
Start: 2018-12-14 | End: 2018-12-14

## 2018-12-14 RX ORDER — FENTANYL CITRATE 50 UG/ML
25 INJECTION, SOLUTION INTRAMUSCULAR; INTRAVENOUS EVERY 5 MIN PRN
Status: COMPLETED | OUTPATIENT
Start: 2018-12-14 | End: 2018-12-14

## 2018-12-14 RX ORDER — LIDOCAINE HCL/PF 100 MG/5ML
SYRINGE (ML) INTRAVENOUS
Status: DISCONTINUED | OUTPATIENT
Start: 2018-12-14 | End: 2018-12-14

## 2018-12-14 RX ORDER — ACETAMINOPHEN 10 MG/ML
INJECTION, SOLUTION INTRAVENOUS
Status: DISCONTINUED | OUTPATIENT
Start: 2018-12-14 | End: 2018-12-14

## 2018-12-14 RX ORDER — ONDANSETRON 2 MG/ML
4 INJECTION INTRAMUSCULAR; INTRAVENOUS DAILY PRN
Status: DISCONTINUED | OUTPATIENT
Start: 2018-12-14 | End: 2018-12-14 | Stop reason: HOSPADM

## 2018-12-14 RX ORDER — FENTANYL CITRATE 50 UG/ML
INJECTION, SOLUTION INTRAMUSCULAR; INTRAVENOUS
Status: DISCONTINUED | OUTPATIENT
Start: 2018-12-14 | End: 2018-12-14

## 2018-12-14 RX ADMIN — OXYCODONE HYDROCHLORIDE 5 MG: 5 TABLET ORAL at 09:12

## 2018-12-14 RX ADMIN — ROPIVACAINE HYDROCHLORIDE: 5 INJECTION, SOLUTION EPIDURAL; INFILTRATION; PERINEURAL at 07:12

## 2018-12-14 RX ADMIN — PROPOFOL 250 MG: 10 INJECTION, EMULSION INTRAVENOUS at 07:12

## 2018-12-14 RX ADMIN — PROPOFOL 50 MG: 10 INJECTION, EMULSION INTRAVENOUS at 07:12

## 2018-12-14 RX ADMIN — MIDAZOLAM HYDROCHLORIDE 2 MG: 1 INJECTION, SOLUTION INTRAMUSCULAR; INTRAVENOUS at 06:12

## 2018-12-14 RX ADMIN — HYDROMORPHONE HYDROCHLORIDE 0.4 MG: 2 INJECTION INTRAMUSCULAR; INTRAVENOUS; SUBCUTANEOUS at 08:12

## 2018-12-14 RX ADMIN — FENTANYL CITRATE 25 MCG: 50 INJECTION, SOLUTION INTRAMUSCULAR; INTRAVENOUS at 08:12

## 2018-12-14 RX ADMIN — OXYCODONE HYDROCHLORIDE 5 MG: 5 TABLET ORAL at 08:12

## 2018-12-14 RX ADMIN — MUPIROCIN: 20 OINTMENT TOPICAL at 05:12

## 2018-12-14 RX ADMIN — LIDOCAINE HYDROCHLORIDE 50 MG: 20 INJECTION, SOLUTION INTRAVENOUS at 07:12

## 2018-12-14 RX ADMIN — FENTANYL CITRATE 100 MCG: 50 INJECTION, SOLUTION INTRAMUSCULAR; INTRAVENOUS at 07:12

## 2018-12-14 RX ADMIN — KETOROLAC TROMETHAMINE 30 MG: 30 INJECTION, SOLUTION INTRAMUSCULAR; INTRAVENOUS at 07:12

## 2018-12-14 RX ADMIN — CEFAZOLIN 2 G: 330 INJECTION, POWDER, FOR SOLUTION INTRAMUSCULAR; INTRAVENOUS at 07:12

## 2018-12-14 RX ADMIN — DEXAMETHASONE SODIUM PHOSPHATE 8 MG: 4 INJECTION, SOLUTION INTRAMUSCULAR; INTRAVENOUS at 07:12

## 2018-12-14 RX ADMIN — ONDANSETRON 4 MG: 2 INJECTION, SOLUTION INTRAMUSCULAR; INTRAVENOUS at 07:12

## 2018-12-14 RX ADMIN — SODIUM CHLORIDE, SODIUM LACTATE, POTASSIUM CHLORIDE, AND CALCIUM CHLORIDE: 600; 310; 30; 20 INJECTION, SOLUTION INTRAVENOUS at 06:12

## 2018-12-14 RX ADMIN — ACETAMINOPHEN 1000 MG: 10 INJECTION, SOLUTION INTRAVENOUS at 07:12

## 2018-12-14 NOTE — LETTER
December 14, 2018         [address]  [facility] [address]  Phone: [phone]  Fax: [phone]       Patient: Anibal Pedro   YOB: 1985  Date of Visit: 12/14/2018    To Whom It May Concern:    Yasmeen Pedro  was at [facility] on 12/14/2018. If you have any questions or concerns, or if I can be of further assistance, please do not hesitate to contact Dr. Stevenson at [phone]

## 2018-12-14 NOTE — TRANSFER OF CARE
"Anesthesia Transfer of Care Note    Patient: Anibal Pedro III    Procedure(s) Performed: Procedure(s) (LRB):  ARTHROSCOPY, KNEE/SYNOVECTOMY (Right)    Patient location: PACU    Anesthesia Type: general    Transport from OR: Transported from OR on 2-3 L/min O2 by NC with adequate spontaneous ventilation    Post pain: adequate analgesia    Post assessment: no apparent anesthetic complications    Post vital signs: stable    Level of consciousness: sedated    Nausea/Vomiting: no nausea/vomiting    Complications: none    Transfer of care protocol was followed      Last vitals:   Visit Vitals  BP (!) 109/54 (BP Location: Right arm, Patient Position: Lying)   Pulse (!) 55   Temp 36.6 °C (97.8 °F) (Oral)   Resp 16   Ht 5' 11" (1.803 m)   Wt 78.9 kg (174 lb)   SpO2 100%   BMI 24.27 kg/m²     "

## 2018-12-14 NOTE — OP NOTE
[facility]   OPERATIVE REPORT   ORTHOPAEDIC SURGERY   PROVIDER: DR. JESSICA SINGLETARY    PATIENT INFORMATION   Anibal Pedro III 33 y.o. male 1985   MRN: [id number]   LOCATION: [facility]     DATE OF PROCEDURE: 12/14/2018     PREOPERATIVE DIAGNOSES:   Right  1. knee possible medial meniscus tear     POSTOPERATIVE DIAGNOSES:   Right  1. knee medial synovial plica  2. knee anterior infrapatellar fat pad hypertrophy with synovitis  3. knee chondromalacia, medial margin of anteromedial femoral condyle    PROCEDURE PERFORMED:   Right  1. knee arthroscopic 2-compartment synovectomy/extensive debridement (CPT 15985)        2. knee arthroscopic limited chondroplasty (CPT 47397)    Surgeon(s) and Role:     * CED Singletary MD - Primary     * Saud Gu MD - Fellow     * SMA Naomi    ANESTHESIA: General with local anesthetic injection    ESTIMATED BLOOD LOSS: 15 cc    IMPLANTS: * No implants in log *     SPECIMENS:   Specimen (12h ago, onward)    None        COMPLICATIONS: None.     INTRAOPERATIVE COUNTS: Correct.     PROPHYLACTIC IV ANTIBIOTICS: Given per [facility] Protocol.    INDICATIONS FOR PROCEDURE:  The patient presented complaining of chronic right knee pain.  Imaging is suspicious for medial meniscus pathology and infrapatellar fat pad syndrome. The patient has failed an extensive course of conservative treatment.  Given the extent and duration of these complaints, the patient has been indicated for diagnostic arthroscopy and meniscus treatment as indicated, synovectomy. Details of such were reviewed preoperatively to include the expected postoperative rehabilitation and recovery course.  Outlined risks and potential complications of surgery include but are not limited to: infection, stiffness, progression of arthritis, tissue re-tearing, neurovascular injury, blood clot formation, and potential need further surgery. The patient has elected to proceed.    PROCEDURE IN  DETAIL:  The patient was identified in preop holding. Permit was obtained for the above procedure. IV antibiotic was initiated for prophylaxis and the patient was brought to the operating room.       After Anesthesia was administered, a Time Out was verbalized with all OR staff agreeing to the patient and procedure.      Examination under anesthesia demonstrated full passive range of motion and normal ligamentous stability.    The right knee and leg were prepped and draped in the usual sterile fashion.      Diagnostic arthroscopy was undertaken after establishing routine anteromedial and anterolateral scope portals.      Significant Findings:  1. Patellofemoral compartment - intact cartilage with central patellar tracking and no tilt; infrapatellar fat pad hypertrophy; thickened medial synovial plica with local abrasive effect on the medial margin of the medial femoral condyle  2. Notch - Normal ACL and PCL  3. Medial Compartment - Meniscus, intact. Cartilage, intact except as previously mentioned  4. Lateral compartment - Meniscus, intact.  Cartilage, intact  5. Loose bodies - none  6. Other - synovitis and fat pad hypertrophy extending from the anterior compartment to the medial suprapatellar recess/compartment    Detailed description:     1. Synovectomy/debridement: A combination of ablator and shaver devices were used to resect the medial synovial plica and remove inflamed infrapatellar fat pad. Synovectomy of the anterior interval and superomedial recess was performed. Care was taken to maintain hemostasis throughout  2. Chondroplasty: A shaver was used to perform a limited chondroplasty over the anteromedial margin of the medial femoral condyle, which was an area of abrasive wear from the plica. Debridement was performed to remove a few small loose cartilage flaps.     Photos were taken. The portals were closed in standard fashion using 3-0 Nylon suture.    The dressing was placed after local was administered  subcutaneously and the patient was awakened and transferred to the recovery room in satisfactory condition.  There were no apparent complications.     POSTOPERATIVE PLAN: Patient may weight bear as tolerates on crutches. Full range of motion to tolerance. Will start physical therapy right away. ASA 81 mg BID x 2 weeks for DVT prophylaxis.

## 2018-12-14 NOTE — INTERVAL H&P NOTE
The patient has been examined and the H&P has been reviewed:    I concur with the findings and no changes have occurred since H&P was written.    Anesthesia/Surgery risks, benefits and alternative options discussed and understood by patient/family.          Active Hospital Problems    Diagnosis  POA    Internal derangement of right knee [M23.91]  Yes      Resolved Hospital Problems   No resolved problems to display.

## 2018-12-14 NOTE — BRIEF OP NOTE
[facility]  Brief Operative Note     SUMMARY     Surgery Date: 12/14/2018     Surgeon(s) and Role:     * CED Stevenson MD - Primary    Assisting Surgeon: Oscar SINGH    Pre-op Diagnosis:  Hypertrophy of fat pad of knee [M79.4]  Old tear of medial meniscus of right knee, unspecified tear type [M23.203]    Post-op Diagnosis:  Post-Op Diagnosis Codes:     * Hypertrophy of fat pad of knee [M79.4]     * Old tear of medial meniscus of right knee, unspecified tear type [M23.203]    Procedure(s) (LRB):  ARTHROSCOPY, KNEE/SYNOVECTOMY (Right)    Anesthesia: General    Description of the findings of the procedure: see dictated note    Findings/Key Components: see dictated note    Estimated Blood Loss: * No values recorded between 12/14/2018  7:15 AM and 12/14/2018  7:50 AM *         Specimens:   Specimen (12h ago, onward)    None          Discharge Note    SUMMARY     Admit Date: 12/14/2018    Discharge Date and Time:  12/14/2018 7:51 AM    Hospital Course (synopsis of major diagnoses, care, treatment, and services provided during the course of the hospital stay): patient tolerated the procedure well. Plan to d/c home after pacu criteria met.     Final Diagnosis: Post-Op Diagnosis Codes:     * Hypertrophy of fat pad of knee [M79.4]     * Old tear of medial meniscus of right knee, unspecified tear type [M23.203]    Disposition: Home or Self Care    Follow Up/Patient Instructions:     Medications:  Reconciled Home Medications:      Medication List      ASK your doctor about these medications    aspirin 81 MG EC tablet  Commonly known as:  ECOTRIN  Take 1 tablet twice a day with food starting after surgery (breakfast and dinner).     CLARAVIS 40 MG capsule  Generic drug:  ISOtretinoin  Take 40 mg by mouth once a week.     ibuprofen 600 MG tablet  Commonly known as:  ADVIL,MOTRIN  Take 1 tablet (600 mg total) by mouth every 6 (six) hours as needed for Pain.     ondansetron 4 MG  tablet  Commonly known as:  ZOFRAN  Take 1 tablet (4 mg total) by mouth every 8 (eight) hours as needed for Nausea.     oxyCODONE 5 MG immediate release tablet  Commonly known as:  ROXICODONE  Take 1-2 tablets as needed for pain every 4-6 hours.          No discharge procedures on file.

## 2018-12-14 NOTE — PLAN OF CARE
Anibal Pedro III has met all discharge criteria from Phase II. Vital Signs are stable, ambulating  without difficulty. Discharge instructions given, patient verbalized understanding. Discharged from facility via wheelchair in stable condition.

## 2018-12-14 NOTE — OR NURSING
"Small"nick" to area above right knee from skin prep. Bled small amount initially, no bleeding now    Area wiped with chlorhexadine wipes., tolerated well    "

## 2018-12-14 NOTE — DISCHARGE INSTRUCTIONS

## 2018-12-14 NOTE — OR NURSING
Reviewed 's  knee arthroscopy discharge instructions and demonstrated the postoperative equipment (polar ice), with verbalized understanding per patient, wife and parents.                           .

## 2018-12-14 NOTE — ANESTHESIA POSTPROCEDURE EVALUATION
"Anesthesia Post Evaluation    Patient: Anibal Pedro III    Procedure(s) Performed: Procedure(s) (LRB):  ARTHROSCOPY, KNEE/SYNOVECTOMY (Right)    Final Anesthesia Type: general  Patient location during evaluation: PACU  Patient participation: Yes- Able to Participate  Level of consciousness: awake and alert  Post-procedure vital signs: reviewed and stable  Pain management: adequate  Airway patency: patent  PONV status at discharge: No PONV  Anesthetic complications: no      Cardiovascular status: blood pressure returned to baseline  Respiratory status: unassisted  Hydration status: euvolemic  Follow-up not needed.        Visit Vitals  /77   Pulse 78   Temp 36.6 °C (97.8 °F) (Oral)   Resp 16   Ht 5' 11" (1.803 m)   Wt 78.9 kg (174 lb)   SpO2 100%   BMI 24.27 kg/m²       Pain/Shilpa Score: Pain Rating Prior to Med Admin: 6 (12/14/2018  9:20 AM)  Pain Rating Post Med Admin: 6 (12/14/2018  9:20 AM)  Shilpa Score: 10 (12/14/2018 10:30 AM)        "

## 2018-12-14 NOTE — LETTER
December 14, 2018         [address]  [facility] [address]  Phone: [phone]  Fax: [phone]       Patient: Anibal Pedro   YOB: 1985  Date of Visit: 12/14/2018    To Whom It May Concern:    Yasmeen Pedro  was at [facility] on 12/14/2018. {HE SHE CAPITAL LETTER:52659} may return to work/school on *** {With/no:98325} restrictions. If you have any questions or concerns, or if I can be of further assistance, please do not hesitate to contact me.    Sincerely,    Jovita Warren RN

## 2018-12-17 ENCOUNTER — CLINICAL SUPPORT (OUTPATIENT)
Dept: REHABILITATION | Facility: OTHER | Age: 33
End: 2018-12-17
Payer: COMMERCIAL

## 2018-12-17 DIAGNOSIS — R26.9 GAIT DIFFICULTY: ICD-10-CM

## 2018-12-17 DIAGNOSIS — M25.561 ACUTE PAIN OF RIGHT KNEE: ICD-10-CM

## 2018-12-17 DIAGNOSIS — R29.898 WEAKNESS OF RIGHT LOWER EXTREMITY: ICD-10-CM

## 2018-12-17 PROCEDURE — 97161 PT EVAL LOW COMPLEX 20 MIN: CPT | Mod: PN | Performed by: PHYSICAL THERAPIST

## 2018-12-17 PROCEDURE — 97110 THERAPEUTIC EXERCISES: CPT | Mod: PN | Performed by: PHYSICAL THERAPIST

## 2018-12-17 NOTE — PLAN OF CARE
[facility]  Physical Therapy Initial Evaluation    Name: Anibal Pedro III  Clinic Number: [id number]    Therapy Diagnosis:   Encounter Diagnoses   Name Primary?   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 Full range of motion to tolerance. Will start physical therapy right away. ASA 81 mg BID x 2 weeks for DVT prophylaxis.       Subjective   Date of onset: 12/14/2018  PROCEDURE PERFORMED:   Right  1. knee arthroscopic 2-compartment synovectomy/extensive debridement (CPT 35687)        2. knee arthroscopic limited chondroplasty (CPT 57429)    History of current condition - Anibal reports: having R knee pain for over a year. It kept him from running and playing basketball. He had Surgery on Friday with Dr Stevenson. Pt reporting trying to wean off the pain medication and icing it often. He starts back work from home tomorrow. Pt denies any fever, chills. Numbness.      Medical History:   No past medical history on file.    Surgical History:   Anibal Pedro III  has a past surgical history that includes Anterior cruciate ligament repair; ARTHROSCOPY, KNEE/SYNOVECTOMY (Right, 12/14/2018); ARTHROSCOPY, KNEE, WITH CHONDROPLASTY  (12/14/2018); and SYNOVECTOMY, KNEE (12/14/2018).    Medications:   Anibal has a current medication list which includes the following prescription(s): aspirin, ibuprofen, isotretinoin, ondansetron, and oxycodone.    Allergies:   Review of patient's allergies indicates:  No Known Allergies     Imaging, bone scan films: Postoperative changes of ACL repair identified involving the left hip.  Fixating luz identified in the right femur .  The joint spaces are well preserved.  No fracture or dislocation.  No bone destruction identified    Prior Therapy: yes  Social History:  lives with their spouse  Occupation: office work  Prior Level of Function: independent, playing  game of basketball, running  Current Level of Function: walking with crutches, unable to squat, climb stairs    Pain:  Current 7/10, worst 9/10, best 4/10   Location: right anterior-medial knee   Description: Aching  Aggravating Factors: Bending and Walking  Easing Factors: pain medication and ice    Pts goals: To return to full function and be able to run without being limited by pain    Objective     Observation: presents to clinic with his wife, ace bandage donned, B axillary crutches    Range of Motion:   Knee Left active Left Passive Right Active R passive   Flexion 140 145 84 90   Extension +10 +12 0 +5       Lower Extremity Strength  Right LE  Left LE    Knee extension: 3/5 Knee extension: 5/5   Knee flexion: 3/5 Knee flexion: 5/5   Hip flexion: 3/5 Hip flexion: 5/5   Hip abduction: 4-/5 Hip abduction: 5/5   Ankle dorsiflexion: 4+/5 Ankle dorsiflexion: 4+/5     Joint Mobility: fair R Patellar mobility    Palpation: TTP medial joint line R knee    Sensation: BLE light touch sensation grossly intact all dermatomes    Flexibility: NT     Edema:     Girth Measurement Joint line 5 cm below 10 cm above   Left 37.5 cm 34.5 cm 40 cm   Right 39 cm 34.5 cm 40.5 cm       CMS Impairment/Limitation/Restriction for FOTO knee Survey    Therapist  "reviewed FOTO scores for Anibal Pedro III on 12/17/2018.   FOTO documents entered into EPIC - see Media section.    Limitation Score: 69%    Goal: 32%         TREATMENT   Treatment Time In: 225pm  Treatment Time Out: 300pm  Total Treatment time separate from Evaluation: 35 minutes    Anibal received therapeutic exercises to develop ROM and flexibility for 20 minutes including:  Knee flexion PROM off EOB x 10  Quad sets with towel roll under heel 10" x 10  SLR with quad set 2 x 10  S/L hip abduction 2 x 10  Seated hamstrings stretch with towel into hyper 20" x 4    Anibal received the following manual therapy techniques: Joint mobilizations were applied to the: R patellae for 5 minutes    Edmelonie received cold pack for 10 minutes to Right knee with extra layers. Pt tolerated treatment well    Home Exercises and Patient Education Provided    Education provided:   - HEP, gait training with unilateral crutch, wound care    Written Home Exercises Provided: yes.  Exercises were reviewed and Anibal was able to demonstrate them prior to the end of the session.  Anibal demonstrated good  understanding of the education provided.     See EMR under Patient Instructions for exercises provided 12/17/2018.    Assessment   Anibal is a 33 y.o. male referred to outpatient Physical Therapy with a medical diagnosis of Internal derangement of right knee, tear of medial meniscus of right knee,Hoffa's fat pad disease and Patellofemoral pain syndrome of right knee. Pt presents to clinic POD 3 s/p arthroscopic surgery. Pt presents with decreased ROM R knee, gait abnormality, edema, RLE weakness, and pain.     Pt prognosis is Good.   Pt will benefit from skilled outpatient Physical Therapy to address the deficits stated above and in the chart below, provide pt/family education, and to maximize pt's level of independence.     Plan of care discussed with patient: Yes  Pt's spiritual, cultural and educational needs considered and " patient is agreeable to the plan of care and goals as stated below:     Anticipated Barriers for therapy: none    Medical Necessity is demonstrated by the following  History  Co-morbidities and personal factors that may impact the plan of care Co-morbidities:   none    Personal Factors:   no deficits     low   Examination  Body Structures and Functions, activity limitations and participation restrictions that may impact the plan of care Body Regions:   lower extremities    Body Systems:    ROM  strength  balance  gait  edema  scar formation    Participation Restrictions:   Running, sport    Activity limitations:   Learning and applying knowledge  no deficits    General Tasks and Commands  no deficits    Communication  no deficits    Mobility  walking    Self care  no deficits    Domestic Life  doing house work (cleaning house, washing dishes, laundry)    Interactions/Relationships  no deficits    Life Areas  basic economic transactions    Community and Social Life  recreation and leisure         high   Clinical Presentation stable and uncomplicated low   Decision Making/ Complexity Score: low     Goals:  Short Term Goals (4 Weeks):   1. Patient's right knee flexion to be 130 degrees for improved functional ability to squat  2. Patient to regain full knee extension on right knee for normal gait quality  3. Patient's right lower extremity strength to be 4/5 or greater for improved functional ability to perform ADL's  4. Patient to have little to no edema in right knee as indicated by decreased knee circumference of 1 cm or greater at joint line  Long Term Goals (8 Weeks):   1. Patient to have decreased subjective report of disability as noted by a score of 32% or less on the FOTO knee questionnaire   2. Patient to be independent with home exercise program for improved self management of condition  3. Patient's right lower extremity strength to be 5/5 for improved functional ability to perform house hold chores  4.  Patient to be able to ascend/descend stairs with reciprocal gait pattern and little to no difficulty     Plan   Plan of care Certification: 12/17/2018 to 2/15/87742.    Outpatient Physical Therapy 2-3 times weekly for 8 weeks to include the following interventions: Electrical Stimulation IFC/NMES, Gait Training, Manual Therapy, Moist Heat/ Ice, Neuromuscular Re-ed, Patient Education, Self Care, Therapeutic Activites, Therapeutic Exercise and modalities PRN.     Yeimy Luu, PT

## 2018-12-18 ENCOUNTER — CLINICAL SUPPORT (OUTPATIENT)
Dept: REHABILITATION | Facility: OTHER | Age: 33
End: 2018-12-18
Payer: COMMERCIAL

## 2018-12-18 DIAGNOSIS — R29.898 WEAKNESS OF RIGHT LOWER EXTREMITY: ICD-10-CM

## 2018-12-18 DIAGNOSIS — M25.561 ACUTE PAIN OF RIGHT KNEE: ICD-10-CM

## 2018-12-18 DIAGNOSIS — R26.9 GAIT DIFFICULTY: ICD-10-CM

## 2018-12-18 PROCEDURE — 97014 ELECTRIC STIMULATION THERAPY: CPT | Mod: PN | Performed by: PHYSICAL THERAPIST

## 2018-12-18 PROCEDURE — 97110 THERAPEUTIC EXERCISES: CPT | Mod: PN | Performed by: PHYSICAL THERAPIST

## 2018-12-18 NOTE — PROGRESS NOTES
"  Physical Therapy Daily Treatment Note     Name: Anibal Argueta [facility]  Clinic Number: [id number]    Therapy Diagnosis:   Encounter Diagnoses   Name Primary?   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 Full range of motion to tolerance. Will start physical therapy right away. ASA 81 mg BID x 2 weeks for DVT prophylaxis.       Subjective     Pt reports: Starting working from home today. He has been working in the bed with his laptop trying to bend his knee up to stretch it. He had a hard time regaining extension after L ACL surgery and doesn't want he scar tissue to set in  He was compliant with home exercise program.  Response to previous treatment: soreness  Functional change: able to put more weight on his legs    Pain: 7/10  Location: right anterior knee      Objective     Anibal received therapeutic exercises to develop strength, endurance, ROM, flexibility, posture and core stabilization for 50 minutes including:    Upright bike x 6 min    Knee flexion PROM seated with HOB evelated x 2 min  Quad sets with towel roll under heel 10" x 10  SLR with quad set 2 x 10  S/L hip abduction 2 x 10  Seated hamstrings stretch with towel " "into hyper 20" x 4  Prone hangs 5 min  Prone knee flexion stretch 2 min  Heel raises 3 x 10  TKE with orange band in neutral and slight hip extension x 15 ea    Gait training without AD x 5 min     Anibal received the following manual therapy techniques: Joint mobilizations were applied to the: R patellae for 5 minutes     Anibal received cold pack for 15 minutes to Right knee with extra layers. and concurrently with ESU for pain and edema    Home Exercises Provided and Patient Education Provided     Education provided:   - educated on progressing FWB and weaning crutches as tolerated    Written Home Exercises Provided: Patient instructed to cont prior HEP.  Exercises were reviewed and Anibal was able to demonstrate them prior to the end of the session.  Anibal demonstrated good  understanding of the education provided.     See EMR under Media for exercises provided prior visit.    Assessment     Pt tolerated treatment session very well. Progressing with improved knee flexion to 128 degrees from 90 degrees on eval.   Anibal is progressing well towards his goals.   Pt prognosis is Good.     Pt will continue to benefit from skilled outpatient physical therapy to address the deficits listed in the problem list box on initial evaluation, provide pt/family education and to maximize pt's level of independence in the home and community environment.     Pt's spiritual, cultural and educational needs considered and pt agreeable to plan of care and goals.    Anticipated barriers to physical therapy: none    Goals:   Short Term Goals (4 Weeks):   1. Patient's right knee flexion to be 130 degrees for improved functional ability to squat- progressing, not met  2. Patient to regain full knee extension on right knee for normal gait quality- progressing, not met  3. Patient's right lower extremity strength to be 4/5 or greater for improved functional ability to perform ADL's- progressing, not met  4. Patient to have little to no " edema in right knee as indicated by decreased knee circumference of 1 cm or greater at joint line - progressing, not met  Long Term Goals (8 Weeks):   1. Patient to have decreased subjective report of disability as noted by a score of 32% or less on the FOTO knee questionnaire - progressing, not met  2. Patient to be independent with home exercise program for improved self management of condition- progressing, not met  3. Patient's right lower extremity strength to be 5/5 for improved functional ability to perform house hold chores- progressing, not met  4. Patient to be able to ascend/descend stairs with reciprocal gait pattern and little to no difficulty - progressing, not met    Plan     Continue per post op protocol as tolerated     Yeimy Luu, PT

## 2018-12-20 ENCOUNTER — CLINICAL SUPPORT (OUTPATIENT)
Dept: REHABILITATION | Facility: OTHER | Age: 33
End: 2018-12-20
Payer: COMMERCIAL

## 2018-12-20 DIAGNOSIS — R29.898 WEAKNESS OF RIGHT LOWER EXTREMITY: ICD-10-CM

## 2018-12-20 DIAGNOSIS — R26.9 GAIT DIFFICULTY: ICD-10-CM

## 2018-12-20 DIAGNOSIS — M25.561 ACUTE PAIN OF RIGHT KNEE: ICD-10-CM

## 2018-12-20 PROCEDURE — 97014 ELECTRIC STIMULATION THERAPY: CPT | Mod: PN | Performed by: PHYSICAL THERAPIST

## 2018-12-20 PROCEDURE — 97110 THERAPEUTIC EXERCISES: CPT | Mod: PN | Performed by: PHYSICAL THERAPIST

## 2018-12-20 NOTE — PROGRESS NOTES
"  Physical Therapy Daily Treatment Note     Name: Anibal Argueta [facility]  Clinic Number: [id number]    Therapy Diagnosis:   Encounter Diagnoses   Name Primary?   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 Full range of motion to tolerance. Will start physical therapy right away. ASA 81 mg BID x 2 weeks for DVT prophylaxis.       Subjective     Pt reports: Feeling more pain when first getting up in the morning and after sitting a while with work. He is going to [place] for a few weeks and scheduled for continued therapy upon his return  He was compliant with home exercise program.  Response to previous treatment: soreness  Functional change: able to put more weight on his legs    Pain: 6/10  Location: right anterior-medial knee      Objective     R knee PROM: 8-0-130 degrees    Anibal received therapeutic exercises to develop strength, endurance, ROM, flexibility, posture and core stabilization for 40 minutes including:    Upright bike x 10 min    Knee flexion PROM seated with HOB evelated x 2 min  Quad sets with towel roll under heel 10" x 10  SLR with quad set 3 x 10  S/L hip abduction 3 x 10  +Prone hip extension 3 x " "10  Seated hamstrings stretch with towel into hyper 20" x 4  Prone knee flexion stretch 2 min  Heel raises 3 x 10  TKE with orange band in neutral and slight hip extension x 15 ea    Gait training without AD x 5 min     Anibal received the following manual therapy techniques: Joint mobilizations were applied to the: R patellae for 5 minutes     Edmelonie received cold pack for 15 minutes to Right knee with extra layers. and concurrently with ESU for pain and edema    Home Exercises Provided and Patient Education Provided     Education provided:   - issued additional exercises including TKE, prone hip extension, prone knee flexion, and heel raises    Written Home Exercises Provided: Patient instructed to cont prior HEP.  Exercises were reviewed and Anibal was able to demonstrate them prior to the end of the session.  Anibal demonstrated good  understanding of the education provided.     See EMR under Media for exercises provided prior visit.    Assessment     Pt tolerated treatment session very well. Continues to progress with increased knee ROM each visit.   Anibal is progressing well towards his goals.   Pt prognosis is Good.     Pt will continue to benefit from skilled outpatient physical therapy to address the deficits listed in the problem list box on initial evaluation, provide pt/family education and to maximize pt's level of independence in the home and community environment.     Pt's spiritual, cultural and educational needs considered and pt agreeable to plan of care and goals.    Anticipated barriers to physical therapy: none    Goals:   Short Term Goals (4 Weeks):   1. Patient's right knee flexion to be 130 degrees for improved functional ability to squat-met 12/20/2018  2. Patient to regain full knee extension on right knee for normal gait quality- progressing, not met  3. Patient's right lower extremity strength to be 4/5 or greater for improved functional ability to perform ADL's- progressing, not met  4. " Patient to have little to no edema in right knee as indicated by decreased knee circumference of 1 cm or greater at joint line - progressing, not met  Long Term Goals (8 Weeks):   1. Patient to have decreased subjective report of disability as noted by a score of 32% or less on the FOTO knee questionnaire - progressing, not met  2. Patient to be independent with home exercise program for improved self management of condition- progressing, not met  3. Patient's right lower extremity strength to be 5/5 for improved functional ability to perform house hold chores- progressing, not met  4. Patient to be able to ascend/descend stairs with reciprocal gait pattern and little to no difficulty - progressing, not met    Plan     Continue per post op protocol as tolerated     Yeimy Luu, PT

## 2018-12-27 ENCOUNTER — TELEPHONE (OUTPATIENT)
Dept: SPORTS MEDICINE | Facility: CLINIC | Age: 33
End: 2018-12-27

## 2019-01-07 NOTE — PROGRESS NOTES
S:Anibal Pedro III presents for post-operative evaluation.     DATE OF PROCEDURE: 12/14/2018   PROCEDURE PERFORMED:   Right  1. knee arthroscopic 2-compartment synovectomy/extensive debridement   2. knee arthroscopic limited chondroplasty    Anibal Pedro III reports to be doing well 3.5 wk s/p the above mentioned procedure. Denies fevers, chills, night sweats, chest pain, difficulty breathing, calf pain or tenderness. Presents today FWB without assistance. Going to therapy 2xWeek at the [facility]. Has some diffuse soreness, but no sig pain complaints. Has d/c'd pain medication.     O: RLE - The incisions are healing well. No signs of infection. Sutures were removed. Trace swelling. No significant pain or unusual tenderness. Full extension. Flexion to 130, equal to contralateral side. NVI distally.     A/P: Arthroscopic pictures were reviewed with the patient. Plan to follow the rehab plan as previously outlined. RTC in 4 weeks. All questions answered.

## 2019-01-08 ENCOUNTER — OFFICE VISIT (OUTPATIENT)
Dept: SPORTS MEDICINE | Facility: CLINIC | Age: 34
End: 2019-01-08
Payer: COMMERCIAL

## 2019-01-08 ENCOUNTER — CLINICAL SUPPORT (OUTPATIENT)
Dept: REHABILITATION | Facility: OTHER | Age: 34
End: 2019-01-08
Payer: COMMERCIAL

## 2019-01-08 VITALS
BODY MASS INDEX: 24.36 KG/M2 | SYSTOLIC BLOOD PRESSURE: 132 MMHG | HEART RATE: 76 BPM | HEIGHT: 71 IN | DIASTOLIC BLOOD PRESSURE: 72 MMHG | WEIGHT: 174 LBS

## 2019-01-08 DIAGNOSIS — Z47.89 SURGICAL AFTERCARE, MUSCULOSKELETAL SYSTEM: Primary | ICD-10-CM

## 2019-01-08 DIAGNOSIS — R29.898 WEAKNESS OF RIGHT LOWER EXTREMITY: ICD-10-CM

## 2019-01-08 DIAGNOSIS — M25.561 ACUTE PAIN OF RIGHT KNEE: ICD-10-CM

## 2019-01-08 DIAGNOSIS — R26.9 GAIT DIFFICULTY: ICD-10-CM

## 2019-01-08 PROCEDURE — 99024 PR POST-OP FOLLOW-UP VISIT: ICD-10-PCS | Mod: S$GLB,,, | Performed by: ORTHOPAEDIC SURGERY

## 2019-01-08 PROCEDURE — 97140 MANUAL THERAPY 1/> REGIONS: CPT | Mod: PN | Performed by: PHYSICAL THERAPIST

## 2019-01-08 PROCEDURE — 97014 ELECTRIC STIMULATION THERAPY: CPT | Mod: PN | Performed by: PHYSICAL THERAPIST

## 2019-01-08 PROCEDURE — 99024 POSTOP FOLLOW-UP VISIT: CPT | Mod: S$GLB,,, | Performed by: ORTHOPAEDIC SURGERY

## 2019-01-08 PROCEDURE — 97110 THERAPEUTIC EXERCISES: CPT | Mod: PN | Performed by: PHYSICAL THERAPIST

## 2019-01-08 PROCEDURE — 99999 PR PBB SHADOW E&M-EST. PATIENT-LVL III: ICD-10-PCS | Mod: PBBFAC,,, | Performed by: ORTHOPAEDIC SURGERY

## 2019-01-08 PROCEDURE — 99999 PR PBB SHADOW E&M-EST. PATIENT-LVL III: CPT | Mod: PBBFAC,,, | Performed by: ORTHOPAEDIC SURGERY

## 2019-01-08 NOTE — PROGRESS NOTES
Physical Therapy Daily Treatment Note     Name: Anibal Argueta [facility]  Clinic Number: [id number]    Therapy Diagnosis:   Encounter Diagnoses   Name Primary?   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 Full range of motion to tolerance. Will start physical therapy right away. ASA 81 mg BID x 2 weeks for DVT prophylaxis.       Subjective     Pt reports: No problems with his R knee on vacation besides stiffness when sitting still on plane.   He was compliant with home exercise program.  Response to previous treatment: the exercises in home program are getting easy  Functional change: walking without crutches    Pain: 5/10 at worst  Location: right anterior-medial knee      Objective     R knee PROM: 8-0-145 degrees  R knee AROM: 0-0-140 degrees    MMT RLE:  Quads: 4/5  Hamstrings: 4/5  Gluteus medius: 4+/5  Gluteus jose alfredo: 4+/5  Gastrocnemius: 4/5    Anibal received therapeutic exercises to develop strength, endurance, ROM, flexibility, posture and core stabilization for 40 minutes including:    Upright bike x 6 min    Knee flexion PROM seated with HOB evelated x 2 min (np)  Quad sets with  "towel roll under heel 10" x 10 (np)  SLR with quad set 3 x 10  S/L hip abduction 3 x 10 (np)  Prone hip extension 3 x 10  Seated hamstrings stretch with towel into hyper 20" x 4  Prone knee flexion stretch 2 min  +Prone knee extension 3# x 5 min  Heel raises 3 x 10  TKE with orange band in neutral and slight hip extension x 15 ea  gastroc stretch at wedge 90 sec  Bridging 5" x 20  Clams blue TB x 20 B  Planks 30" x 2  Mini squat on shuttle DL 75#    Gait training without AD x 5 min     Anibal received the following manual therapy techniques: Patella Joint mobilizations and iaSTM quads, IT band, and hamstrings were applied to the: R knee for 10 minutes     Anibal received cold pack for 10 minutes to Right knee with extra layers. and concurrently with ESU for pain and edema    Home Exercises Provided and Patient Education Provided     Education provided:   - issued additional exercises including TKE, prone hip extension, prone knee flexion, and heel raises    Written Home Exercises Provided: Patient instructed to cont prior HEP.  Exercises were reviewed and Anibal was able to demonstrate them prior to the end of the session.  Anibal demonstrated good  understanding of the education provided.     See EMR under Media for exercises provided prior visit.    Assessment     Pt progressing well with improvements in pain, edema, strength, and ROM. Pt with inferior patellar pain with end range flexion. Improvements in pain reported following soft tissue mobilization rectus femoris and IT Band. Pt has met 3/4 short term goals at this time.  Anibal is progressing well towards his goals.   Pt prognosis is Good.     Pt will continue to benefit from skilled outpatient physical therapy to address the deficits listed in the problem list box on initial evaluation, provide pt/family education and to maximize pt's level of independence in the home and community environment.     Pt's spiritual, cultural and educational needs considered " and pt agreeable to plan of care and goals.    Anticipated barriers to physical therapy: none    Goals:   Short Term Goals (4 Weeks):   1. Patient's right knee flexion to be 130 degrees for improved functional ability to squat-met 12/20/2018  2. Patient to regain full knee extension on right knee for normal gait quality- progressing, not met  3. Patient's right lower extremity strength to be 4/5 or greater for improved functional ability to perform ADL's- Met 1/8/2019  4. Patient to have little to no edema in right knee as indicated by decreased knee circumference of 1 cm or greater at joint line - met 1/8/2019  Long Term Goals (8 Weeks):   1. Patient to have decreased subjective report of disability as noted by a score of 32% or less on the FOTO knee questionnaire - progressing, not met  2. Patient to be independent with home exercise program for improved self management of condition- progressing, not met  3. Patient's right lower extremity strength to be 5/5 for improved functional ability to perform house hold chores- progressing, not met  4. Patient to be able to ascend/descend stairs with reciprocal gait pattern and little to no difficulty - progressing, not met    Plan     Continue to progress per post op protocol as tolerated     Yeimy Luu, PT

## 2019-02-11 ENCOUNTER — OFFICE VISIT (OUTPATIENT)
Dept: SPORTS MEDICINE | Facility: CLINIC | Age: 34
End: 2019-02-11
Payer: COMMERCIAL

## 2019-02-11 VITALS
SYSTOLIC BLOOD PRESSURE: 140 MMHG | HEIGHT: 71 IN | BODY MASS INDEX: 24.36 KG/M2 | WEIGHT: 174 LBS | HEART RATE: 71 BPM | DIASTOLIC BLOOD PRESSURE: 89 MMHG

## 2019-02-11 DIAGNOSIS — Z47.89 SURGICAL AFTERCARE, MUSCULOSKELETAL SYSTEM: Primary | ICD-10-CM

## 2019-02-11 PROCEDURE — 99999 PR PBB SHADOW E&M-EST. PATIENT-LVL III: CPT | Mod: PBBFAC,,, | Performed by: ORTHOPAEDIC SURGERY

## 2019-02-11 PROCEDURE — 99024 PR POST-OP FOLLOW-UP VISIT: ICD-10-PCS | Mod: S$GLB,,, | Performed by: ORTHOPAEDIC SURGERY

## 2019-02-11 PROCEDURE — 99024 POSTOP FOLLOW-UP VISIT: CPT | Mod: S$GLB,,, | Performed by: ORTHOPAEDIC SURGERY

## 2019-02-11 PROCEDURE — 99999 PR PBB SHADOW E&M-EST. PATIENT-LVL III: ICD-10-PCS | Mod: PBBFAC,,, | Performed by: ORTHOPAEDIC SURGERY

## 2019-02-11 NOTE — PROGRESS NOTES
S:Anibal Pedro III presents for post-operative evaluation.     DATE OF PROCEDURE: 12/14/2018   PROCEDURE PERFORMED:   Right  1. knee arthroscopic 2-compartment synovectomy/extensive debridement   2. knee arthroscopic limited chondroplasty    Anibal Pedro III reports back to clinic 8 wk s/p the above mentioned procedure. Presents today FWB without assistance. Was going to therapy at the [place] location but has transitioned to a home exercise program. Focusing on regaining quad strength now. Has some anterior peripatellar soreness, but states this is gradually improving with time. Reports a nonpainful intermittent retropatellar click. No swelling or mechanical symptoms. Has not returned to running yet. Pleased thus far.     O: RLE - The incisions are well healed. No signs of infection. No effusion or swelling. Mild peripatellar tenderness. Central patellar tracking. Improved quad strength. Full extension. Flexion to 130, equal to contralateral side. No medial or lateral joint line tenderness. Negative McMurrays. Ligamentously stable. NVI distally.     A/P: Anibal is making good progress. Continue working on a HEP to improve quad strength. Also discussed hamstring stretching and hip abductor strengthening. Gradual return to running.  Return to clinic in 6-8 weeks as needed.

## 2022-05-06 NOTE — H&P (VIEW-ONLY)
Anibal Pedro III  is here for a completion of his perioperative paperwork. he  Is scheduled to undergo  R Knee arthroscopic medial meniscectomy, chondroplasty, fat pad debridement   on 12/14/2018.  He is a healthy individual and does not need clearance for this procedure.     Risks, indications and benefits of the surgical procedure were discussed with the patient. All questions with regard to surgery, rehab, expected return to functional activities, activities of daily living and recreational endeavors were answered to his satisfaction.    Patient was informed and understands the risks of surgery are greater for patients with a current condition or hx of heart disease, obesity, clotting disorders, recurrent infections, steroid use, current or past smoking, and factors such as sedentary lifestyle and noncompliance with medications, therapy or f/u. The degree of the increased risk is hard to estimate w/ any degree of precision.    Once no other questions were asked, a brief history and physical exam was then performed.    PAST MEDICAL HISTORY: No past medical history on file.  PAST SURGICAL HISTORY: No past surgical history on file.  FAMILY HISTORY:   Family History   Problem Relation Age of Onset    Cancer Neg Hx      SOCIAL HISTORY:   Social History     Socioeconomic History    Marital status: Single     Spouse name: Not on file    Number of children: Not on file    Years of education: Not on file    Highest education level: Not on file   Social Needs    Financial resource strain: Not on file    Food insecurity - worry: Not on file    Food insecurity - inability: Not on file    Transportation needs - medical: Not on file    Transportation needs - non-medical: Not on file   Occupational History    Not on file   Tobacco Use    Smoking status: Never Smoker   Substance and Sexual Activity    Alcohol use: Not on file    Drug use: Not on file    Sexual activity: Not on file   Other Topics  Concern    Not on file   Social History Narrative    Not on file       MEDICATIONS:   Current Outpatient Medications:     ibuprofen (ADVIL,MOTRIN) 600 MG tablet, Take 1 tablet (600 mg total) by mouth every 6 (six) hours as needed for Pain., Disp: 20 tablet, Rfl: 0    ondansetron (ZOFRAN) 4 MG tablet, Take 1 tablet (4 mg total) by mouth every 8 (eight) hours as needed., Disp: 12 tablet, Rfl: 0  ALLERGIES: Review of patient's allergies indicates:  No Known Allergies    Review of Systems   Constitution: Negative. Negative for chills, fever and night sweats.   HENT: Negative for congestion and headaches.    Eyes: Negative for blurred vision, left vision loss and right vision loss.   Cardiovascular: Negative for chest pain and syncope.   Respiratory: Negative for cough and shortness of breath.    Endocrine: Negative for polydipsia, polyphagia and polyuria.   Hematologic/Lymphatic: Negative for bleeding problem. Does not bruise/bleed easily.   Skin: Negative for dry skin, itching and rash.   Musculoskeletal: Negative for falls and muscle weakness.   Gastrointestinal: Negative for abdominal pain and bowel incontinence.   Genitourinary: Negative for bladder incontinence and nocturia.   Neurological: Negative for disturbances in coordination, loss of balance and seizures.   Psychiatric/Behavioral: Negative for depression. The patient does not have insomnia.    Allergic/Immunologic: Negative for hives and persistent infections.     PHYSICAL EXAM:  GEN: A&Ox3, WD WN NAD  HEENT: WNL  CHEST: CTAB, no W/R/R  HEART: RRR, no M/R/G   ABD: Soft, NT ND, BS x4 QUADS  MS: Refer to previous note for detailed MS exam  NEURO: CN II-XII intact       The surgical consent was then reviewed with the patient, who agreed with all the contents of the consent form and it was signed.     PHYSICAL THERAPY:  He was also instructed regarding physical therapy and will begin on POD#1-3. He is doing physical therapy at [facility].      POST OP CARE: Instructions were reviewed including care of the wound and dressing after surgery and when he can shower.     PAIN MANAGEMENT: Anibal Pedro III was instructed regarding the Polar ice unit that will be in place after surgery and his postoperative pain medications.     MEDICATION:  Roxicodone 5 mg 1-2 q 4 hours PRN for pain  Zofran 4 mg q 8 hours PRN for nausea and vomiting.  Aspirin 81mg BID x 2 weeks for DVT prophylaxis starting on the evening after surgery.    Patient was instructed to purchase and take Colace to counter possible GI side effects of taking opiates.     DVT prophylaxis was discussed with the patient today including risk factors for developing DVTs and history of DVTs. The patient was asked if any specific recommendations were given from the doctor/s that did pre-operative surgical clearance.      If the patient was previously taking 81mg baby aspirin, they were told to not take additional baby aspirin, using the above stated aspirin and to restart the 81mg aspirin daily after completion of the aspirin dose.      Patient was also told to buy over the counter Prilosec medication and take it once daily for GI protection as long as they are taking NSAIDs or Aspirin.     The patient was told that narcotic pain medications may make them drowsy and instructions were given to not sign legal documents, drive or operate heavy machinery, cars, or equipment while under the influence of narcotic medications.     Dr. Stevenson was present in clinic during this pre-op evaluation. The patient was offered the opportunity to ask Dr. Stevenson any further questions regarding the procedure which may not have been addressed during their previous informed consent discussion. The patient has declined to see Dr. Stevenson today.    As there were no other questions to be asked, he was given my business card along with Dr. Stevenson's business card if he has any questions or concerns prior to  surgery or in the postop period.      77

## 2022-06-07 NOTE — PROGRESS NOTES
"CC: Right knee pain    32 y.o. Male who returns to clinic today to review the MRI and discuss possible treatment options. 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 No new complaints since LOV.  Unable to run due to point localized anteromedial knee pain - adjacent to medial patellar tendon and "deep". No mechanical sxs.      Previous history: Right knee pain x 4-6 weeks with atraumatic onset. Pain began while running. Pain localizes anteromedial with radiation along medial joint line; initermittent pain with daily walking, worse with running. Usually runs up to 5 miles a day. Has backed off from this completely over the last month without improvement in sxs. Denies swelling or effusions. No prominent mechanical symptoms. Denies instability. Better with rest, hot/cold modalities. History of previous IM nail right femur. Has had knee pain in past - MRI 2013 negative for internal derangement. History of L Knee ACLR w/ DB HS Auto in 2011 by Dr. Chacon. Overall doing well on the left side, occasional soreness.     Negative for smoking.   Negative for diabetes.     REVIEW OF SYSTEMS:   Constitution: Negative. Negative for chills, fever and night sweats.    Hematologic/Lymphatic: Negative for bleeding problem. Does not bruise/bleed easily.   Skin: Negative for dry skin, itching and rash.   Musculoskeletal: Negative for falls. Positive for right knee pain and  muscle weakness.     PAST MEDICAL HISTORY:   History reviewed. No pertinent past medical history.    PAST SURGICAL HISTORY:   History reviewed. No pertinent surgical history.    FAMILY HISTORY:   Family History   Problem Relation Age of Onset    Cancer Neg Hx        SOCIAL HISTORY:   Social History     Social History    Marital status: Single     Spouse name: N/A    Number of children: N/A    Years of education: N/A     Occupational History    Not on file.     Social History Main Topics    Smoking status: Never Smoker    Smokeless tobacco: Not on file    Alcohol use Not " "on file    Drug use: Unknown    Sexual activity: Not on file     Other Topics Concern    Not on file     Social History Narrative    No narrative on file       MEDICATIONS:     Current Outpatient Prescriptions:     ibuprofen (ADVIL,MOTRIN) 600 MG tablet, Take 1 tablet (600 mg total) by mouth every 6 (six) hours as needed for Pain., Disp: 20 tablet, Rfl: 0    ondansetron (ZOFRAN) 4 MG tablet, Take 1 tablet (4 mg total) by mouth every 8 (eight) hours as needed., Disp: 12 tablet, Rfl: 0    indomethacin (INDOCIN SR) 75 mg CpSR CR capsule, Take 1 capsule (75 mg total) by mouth 2 (two) times daily with meals. Take with food.  Take a Prilosec 20 mg tablet (over the counter) once a day every day while taking Mobic., Disp: 60 capsule, Rfl: 1    ALLERGIES:   Review of patient's allergies indicates:  No Known Allergies     PHYSICAL EXAMINATION:  /84   Pulse 68   Ht 5' 11" (1.803 m)   Wt 79.4 kg (175 lb)   BMI 24.41 kg/m²   General: Well-developed well-nourished 32 y.o. malein no acute distress   Cardiovascular: Regular rhythm by palpation of distal pulse, normal color and temperature, no concerning varicosities on symptomatic side   Lungs: No labored breathing or wheezing appreciated   Neuro: Alert and oriented ×3   Psychiatric: well oriented to person, place and time, demonstrates normal mood and affect   Skin: No rashes, lesions or ulcers, normal temperature, turgor, and texture on involved extremity    Ortho/SPM Exam   Bilateral neutral standing alignment.  Examination of the right knee shows no swelling or effusion.  Good quadriceps bulk and tone.  Prominent tenderness over the anterior medial joint line/adjacent to medial patellar tendon and deep over the infrapatellar fat pad.  No sig tenderness over the mid medial and posterior medial joint line.  Stable to varus and valgus stress at 0 and 30°.  No pain with valgus stress testing.  No tenderness specifically over the pes bursa.  Min discomfort with " Sunday's testing.  No mechanical symptoms.  Central patellofemoral tracking.  Negative patellar apprehension. Normal patellar mobility.  Negative patellar facet tenderness.  Negative tilt.  No tenderness along course of the patellar tendon. Negative Lachman.  Negative posterior drawer.  Full active and passive range of motion. Tight hamstrings. Weak hip abductors.    IMAGING:    X-rays including standing, weight bearing AP and flexion bilateral knees, RIGHT knee lateral and sunrise views ordered and images reviewed by me show:    Evidence of previous intramedullary nailing.  No acute or chronic changes of significance.    Right Knee MRI was reviewed with the patient. Images show:   1. Central free edge fraying of the body of the medial meniscus. Otherwise, no evidence of internal derangement.   2. Partially visualized intramedullary fixation nail spanning the distal femoral diaphysis.  On my read there's a small area of inflammation and increased signal over the infrapatellar fat pad. Certainly no prominent findings otherwise.     ASSESSMENT:      ICD-10-CM ICD-9-CM   1. Patellofemoral pain syndrome of right knee M22.2X1 719.46   2. 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 Chronic recurrent right knee pain, predominately anteromedially based and deep over fat pad. I do not believe the medial meniscal free edge fraying is the source of his pain. Likely some degree of mixed PFPS/anterior overload and inflammed infrapatellar fat pad. Treatment options were discussed at length.  I believe an intra-articular steroid injection may help with some fat pad symptoms.  We also discussed appropriate strengthening and stretching exercises for the anterior overload disorder.  Would benefit from continued exercise modulation away from repetitive high-impact activity. Also described Indocin which may be beneficial in modulating symptoms before and after running. RTC in 6 weeks  to see how he's doing. Dx scope may be an option is continues to fail conservative tx.     Large Joint Aspiration/Injection  Date/Time: 1/25/2018 4:30 PM  Performed by: CED SINGLETARY  Authorized by: CED SINGLETARY     Consent Done?:  Yes (Verbal)  Indications:  Pain  Procedure site marked: Yes    Timeout: Prior to procedure the correct patient, procedure, and site was verified      Location:  Knee  Site:  R knee  Prep: Patient was prepped and draped in usual sterile fashion    Ultrasonic Guidance for needle placement: No  Needle size:  22 G  Approach:  Lateral  Medications:  40 mg triamcinolone acetonide 40 mg/mL  Patient tolerance:  Patient tolerated the procedure well with no immediate complications           The patient is a 55y Male complaining of

## (undated) DEVICE — TOURNIQUET SB QC SP 34X4IN

## (undated) DEVICE — NDL 18GA X1 1/2 REG BEVEL

## (undated) DEVICE — DRAPE PLASTIC U 60X72

## (undated) DEVICE — PAD KNEE POLAR XL

## (undated) DEVICE — BRACE KNEE T SCOPE PREMIER

## (undated) DEVICE — BANDAGE ACE ELASTIC 6"

## (undated) DEVICE — GAUZE SPONGE 4X4 12PLY

## (undated) DEVICE — PROBE ARTHSCP EDGE ENERGY 50

## (undated) DEVICE — DRESSING XEROFORM FOIL PK 1X8

## (undated) DEVICE — PADDING CAST SPECIALIST 6X4YD

## (undated) DEVICE — SEE MEDLINE ITEM 152530

## (undated) DEVICE — SOL IRR NACL .9% 3000ML

## (undated) DEVICE — SEE MEDLINE ITEM 146292

## (undated) DEVICE — PAD ABD 8X10 STERILE

## (undated) DEVICE — POSITIONER IV ARMBOARD FOAM

## (undated) DEVICE — TUBE SET INFLOW/OUTFLOW

## (undated) DEVICE — APPLICATOR CHLORAPREP ORN 26ML

## (undated) DEVICE — PAD COLD THERAPY KNEE WRAP ON

## (undated) DEVICE — BLADE SHAVER LANZA 4.2X13CM

## (undated) DEVICE — DRESSING SPONGE 16PLY 4X4 NS

## (undated) DEVICE — DRAPE EMERALD 87X114.75X113

## (undated) DEVICE — SEE MEDLINE ITEM 157150

## (undated) DEVICE — Device